# Patient Record
Sex: MALE
[De-identification: names, ages, dates, MRNs, and addresses within clinical notes are randomized per-mention and may not be internally consistent; named-entity substitution may affect disease eponyms.]

---

## 2018-09-09 ENCOUNTER — TRANSCRIPTION ENCOUNTER (OUTPATIENT)
Age: 53
End: 2018-09-09

## 2019-06-24 ENCOUNTER — TRANSCRIPTION ENCOUNTER (OUTPATIENT)
Age: 54
End: 2019-06-24

## 2020-10-04 ENCOUNTER — INPATIENT (INPATIENT)
Facility: HOSPITAL | Age: 55
LOS: 2 days | Discharge: HOME | DRG: 854 | End: 2020-10-07
Attending: STUDENT IN AN ORGANIZED HEALTH CARE EDUCATION/TRAINING PROGRAM | Admitting: HOSPITALIST
Payer: COMMERCIAL

## 2020-10-04 VITALS
SYSTOLIC BLOOD PRESSURE: 185 MMHG | DIASTOLIC BLOOD PRESSURE: 85 MMHG | HEIGHT: 70 IN | TEMPERATURE: 99 F | HEART RATE: 79 BPM | WEIGHT: 175.05 LBS | RESPIRATION RATE: 20 BRPM | OXYGEN SATURATION: 99 %

## 2020-10-04 DIAGNOSIS — D72.829 ELEVATED WHITE BLOOD CELL COUNT, UNSPECIFIED: ICD-10-CM

## 2020-10-04 LAB
ALBUMIN SERPL ELPH-MCNC: 4.8 G/DL — SIGNIFICANT CHANGE UP (ref 3.3–5.2)
ALP SERPL-CCNC: 83 U/L — SIGNIFICANT CHANGE UP (ref 40–120)
ALT FLD-CCNC: 19 U/L — SIGNIFICANT CHANGE UP
ANION GAP SERPL CALC-SCNC: 15 MMOL/L — SIGNIFICANT CHANGE UP (ref 5–17)
APPEARANCE UR: CLEAR — SIGNIFICANT CHANGE UP
AST SERPL-CCNC: 26 U/L — SIGNIFICANT CHANGE UP
BACTERIA # UR AUTO: NEGATIVE — SIGNIFICANT CHANGE UP
BILIRUB SERPL-MCNC: 0.6 MG/DL — SIGNIFICANT CHANGE UP (ref 0.4–2)
BILIRUB UR-MCNC: NEGATIVE — SIGNIFICANT CHANGE UP
BUN SERPL-MCNC: 17 MG/DL — SIGNIFICANT CHANGE UP (ref 8–20)
CALCIUM SERPL-MCNC: 10.3 MG/DL — HIGH (ref 8.6–10.2)
CHLORIDE SERPL-SCNC: 97 MMOL/L — LOW (ref 98–107)
CO2 SERPL-SCNC: 24 MMOL/L — SIGNIFICANT CHANGE UP (ref 22–29)
COLOR SPEC: YELLOW — SIGNIFICANT CHANGE UP
CREAT SERPL-MCNC: 1.09 MG/DL — SIGNIFICANT CHANGE UP (ref 0.5–1.3)
DIFF PNL FLD: ABNORMAL
EPI CELLS # UR: NEGATIVE — SIGNIFICANT CHANGE UP
GLUCOSE SERPL-MCNC: 139 MG/DL — HIGH (ref 70–99)
GLUCOSE UR QL: NEGATIVE MG/DL — SIGNIFICANT CHANGE UP
HCT VFR BLD CALC: 43.5 % — SIGNIFICANT CHANGE UP (ref 39–50)
HGB BLD-MCNC: 14 G/DL — SIGNIFICANT CHANGE UP (ref 13–17)
KETONES UR-MCNC: NEGATIVE — SIGNIFICANT CHANGE UP
LDH SERPL L TO P-CCNC: 290 U/L — HIGH (ref 98–192)
LEUKOCYTE ESTERASE UR-ACNC: NEGATIVE — SIGNIFICANT CHANGE UP
LIDOCAIN IGE QN: 30 U/L — SIGNIFICANT CHANGE UP (ref 22–51)
MCHC RBC-ENTMCNC: 27.7 PG — SIGNIFICANT CHANGE UP (ref 27–34)
MCHC RBC-ENTMCNC: 32.2 GM/DL — SIGNIFICANT CHANGE UP (ref 32–36)
MCV RBC AUTO: 86 FL — SIGNIFICANT CHANGE UP (ref 80–100)
NITRITE UR-MCNC: NEGATIVE — SIGNIFICANT CHANGE UP
PH UR: 5 — SIGNIFICANT CHANGE UP (ref 5–8)
PLATELET # BLD AUTO: 156 K/UL — SIGNIFICANT CHANGE UP (ref 150–400)
POTASSIUM SERPL-MCNC: 5.2 MMOL/L — SIGNIFICANT CHANGE UP (ref 3.5–5.3)
POTASSIUM SERPL-SCNC: 5.2 MMOL/L — SIGNIFICANT CHANGE UP (ref 3.5–5.3)
PROT SERPL-MCNC: 7 G/DL — SIGNIFICANT CHANGE UP (ref 6.6–8.7)
PROT UR-MCNC: 30 MG/DL
RBC # BLD: 5.06 M/UL — SIGNIFICANT CHANGE UP (ref 4.2–5.8)
RBC # FLD: 13.1 % — SIGNIFICANT CHANGE UP (ref 10.3–14.5)
RBC CASTS # UR COMP ASSIST: ABNORMAL /HPF (ref 0–4)
SODIUM SERPL-SCNC: 136 MMOL/L — SIGNIFICANT CHANGE UP (ref 135–145)
SP GR SPEC: 1.02 — SIGNIFICANT CHANGE UP (ref 1.01–1.02)
TROPONIN T SERPL-MCNC: <0.01 NG/ML — SIGNIFICANT CHANGE UP (ref 0–0.06)
UROBILINOGEN FLD QL: NEGATIVE MG/DL — SIGNIFICANT CHANGE UP
WBC # BLD: 90.84 K/UL — CRITICAL HIGH (ref 3.8–10.5)
WBC # FLD AUTO: 90.84 K/UL — CRITICAL HIGH (ref 3.8–10.5)
WBC UR QL: SIGNIFICANT CHANGE UP

## 2020-10-04 PROCEDURE — 76705 ECHO EXAM OF ABDOMEN: CPT | Mod: 26

## 2020-10-04 PROCEDURE — 99285 EMERGENCY DEPT VISIT HI MDM: CPT

## 2020-10-04 PROCEDURE — 74177 CT ABD & PELVIS W/CONTRAST: CPT | Mod: 26

## 2020-10-04 PROCEDURE — 93010 ELECTROCARDIOGRAM REPORT: CPT

## 2020-10-04 PROCEDURE — 99223 1ST HOSP IP/OBS HIGH 75: CPT

## 2020-10-04 RX ORDER — SODIUM CHLORIDE 9 MG/ML
1000 INJECTION INTRAMUSCULAR; INTRAVENOUS; SUBCUTANEOUS
Refills: 0 | Status: DISCONTINUED | OUTPATIENT
Start: 2020-10-04 | End: 2020-10-06

## 2020-10-04 RX ORDER — MORPHINE SULFATE 50 MG/1
1 CAPSULE, EXTENDED RELEASE ORAL EVERY 4 HOURS
Refills: 0 | Status: DISCONTINUED | OUTPATIENT
Start: 2020-10-04 | End: 2020-10-06

## 2020-10-04 RX ORDER — ONDANSETRON 8 MG/1
4 TABLET, FILM COATED ORAL EVERY 6 HOURS
Refills: 0 | Status: DISCONTINUED | OUTPATIENT
Start: 2020-10-04 | End: 2020-10-06

## 2020-10-04 RX ORDER — ONDANSETRON 8 MG/1
4 TABLET, FILM COATED ORAL ONCE
Refills: 0 | Status: COMPLETED | OUTPATIENT
Start: 2020-10-04 | End: 2020-10-04

## 2020-10-04 RX ORDER — METOPROLOL TARTRATE 50 MG
5 TABLET ORAL ONCE
Refills: 0 | Status: COMPLETED | OUTPATIENT
Start: 2020-10-04 | End: 2020-10-04

## 2020-10-04 RX ORDER — MORPHINE SULFATE 50 MG/1
2 CAPSULE, EXTENDED RELEASE ORAL ONCE
Refills: 0 | Status: DISCONTINUED | OUTPATIENT
Start: 2020-10-04 | End: 2020-10-04

## 2020-10-04 RX ORDER — ACETAMINOPHEN 500 MG
650 TABLET ORAL EVERY 6 HOURS
Refills: 0 | Status: DISCONTINUED | OUTPATIENT
Start: 2020-10-04 | End: 2020-10-06

## 2020-10-04 RX ORDER — ENOXAPARIN SODIUM 100 MG/ML
40 INJECTION SUBCUTANEOUS DAILY
Refills: 0 | Status: DISCONTINUED | OUTPATIENT
Start: 2020-10-04 | End: 2020-10-06

## 2020-10-04 RX ADMIN — Medication 650 MILLIGRAM(S): at 19:55

## 2020-10-04 RX ADMIN — ONDANSETRON 4 MILLIGRAM(S): 8 TABLET, FILM COATED ORAL at 14:56

## 2020-10-04 RX ADMIN — SODIUM CHLORIDE 75 MILLILITER(S): 9 INJECTION INTRAMUSCULAR; INTRAVENOUS; SUBCUTANEOUS at 17:09

## 2020-10-04 RX ADMIN — Medication 5 MILLIGRAM(S): at 20:21

## 2020-10-04 RX ADMIN — MORPHINE SULFATE 2 MILLIGRAM(S): 50 CAPSULE, EXTENDED RELEASE ORAL at 14:56

## 2020-10-04 RX ADMIN — MORPHINE SULFATE 1 MILLIGRAM(S): 50 CAPSULE, EXTENDED RELEASE ORAL at 18:47

## 2020-10-04 NOTE — H&P ADULT - GASTROINTESTINAL DETAILS
no masses palpable/no guarding/soft/nontender/no distention/bowel sounds normal/no rebound tenderness

## 2020-10-04 NOTE — H&P ADULT - RS GEN PE MLT RESP DETAILS PC
respirations non-labored/no chest wall tenderness/breath sounds equal/good air movement/airway patent/clear to auscultation bilaterally

## 2020-10-04 NOTE — ED ADULT NURSE REASSESSMENT NOTE - NS ED NURSE REASSESS COMMENT FT1
pt to be admitted wife at bedside, emotional support given, patient anxious  Covid swab sent to lab  report given to Viviane Chew RN

## 2020-10-04 NOTE — ED STATDOCS - CLINICAL SUMMARY MEDICAL DECISION MAKING FREE TEXT BOX
RUQ abdominal pain after eating fatty meal. Concern for biliary colic. Pt with severe leukocytosis concerning for myeloproliferative disorder

## 2020-10-04 NOTE — H&P ADULT - HISTORY OF PRESENT ILLNESS
Mr. Parks is a 54 yo M with no PMHx who presented to the ED for abdominal pain. He states Friday night her drank about 4 old beers and ate a bunch of Pizza, he developed an upset stomach but it resolved after he had a BM. The following day he ate Chipotle for lunch and felt fine but yesterday at about 6pm he developed abdominal discomfort. It is associated with nausea and a small amount of vomiting. Pain is located in the right upper and middle abdomen and does not radiate. He reports increased belching as well. He took some Pepto without relief. He denies fever, chills, weight loss, night sweats or loss of appetite. He does state he has not eaten since the pain started. His mothers has a Hx fo NHL. He denies diarrhea and constipation.     In the ED he was given IV morphine with relief of his abdominal pain, he was found to have a leukocytosis of 90,000. Hematology was consulted. He is pending blood work and an abdominal CT scan. Mr. Parks is a 54 yo M with no PMHx who presented to the ED for abdominal pain. He states Friday night her drank about 4 old beers and ate a bunch of Pizza, he developed an upset stomach but it resolved after he had a BM. The following day he ate Chipotle for lunch and felt fine but yesterday at about 6pm he developed abdominal discomfort. It is associated with nausea and a small amount of vomiting. Pain is located in the right upper and middle abdomen and does not radiate. He reports increased belching as well. He took some Pepto-Bismol  without relief. He denies fever, chills, weight loss, night sweats or loss of appetite. He does state he has not eaten since the pain started. His mothers has a Hx fo NHL. He denies diarrhea and constipation.     In the ED he was given IV morphine with relief of his abdominal pain, he was found to have a leukocytosis of 90,000. Hematology was consulted. He is pending blood work and an abdominal CT scan.

## 2020-10-04 NOTE — H&P ADULT - NSICDXFAMILYHX_GEN_ALL_CORE_FT
FAMILY HISTORY:  Family history of Hodgkin's lymphoma, dementia     FAMILY HISTORY:  Family history of non-Hodgkin's lymphoma

## 2020-10-04 NOTE — ED STATDOCS - OBJECTIVE STATEMENT
54 y/o M with no pertinent PMHx of pancreatitis 20 years ago presents to the ED c/o RUQ abdominal pain x2days. Pt states he went to urgent care and was told he has a possible kidney stone. Pt states after eating chipotle yesterday he began to have abdominal discomfort. Pt states pain and discomfort worsening today. Pt reports feeling bloated. Denies nausea.  Denies hx of abdominal surgeries. Took Pepto-Bismol for pain. Pt states 1 emesis episode last night and dry heaving today. 56 y/o M with PMHx of pancreatitis 20 years ago presents to the ED c/o RUQ abdominal pain x2days. Pt states he went to urgent care and was told he has a possible kidney stone. Pt states after eating Chipotle yesterday he began to have abdominal discomfort. Pt states pain and discomfort are worsening today. He reports feeling bloated. Denies nausea but notes 1 episode of vomiting yesterday and dry heaving today. Denies hx of abdominal surgeries. Took Pepto-Bismol for pain.

## 2020-10-04 NOTE — ED ADULT TRIAGE NOTE - CHIEF COMPLAINT QUOTE
right upper quadrant abdominal pain nausea, went to urgent care had blood in urine. urinated this am.

## 2020-10-04 NOTE — H&P ADULT - ASSESSMENT
56 yo M with no PMHx who presented to the ED for abdominal pain. In the ED he was given IV morphine with relief of his abdominal pain, he was found to have a leukocytosis of 90,000. Hematology was consulted. He is pending blood work and an abdominal CT scan.     Abdominal pain and Leukocytosis  abdominal exam benign  wbc 90,000  - hematology consulted, will see in AM  - CT a/p ordered  - Labs ordered YADI 2, LDH, 56 yo M with no PMHx who presented to the ED for abdominal pain. In the ED he was given IV morphine with relief of his abdominal pain, he was found to have a leukocytosis of 90,000. Hematology was consulted. He is pending blood work and an abdominal CT scan.     Abdominal pain and Leukocytosis  abdominal exam benign  wbc 90,000  - hematology consulted, will see in AM  - CT a/p ordered  - Labs ordered YADI 2, LDH, BCR/ABL, FLow Cytometry, Beta 2 microglobulins  - Repeat CBC in am  - prn pain control  - prn ant-emetic    Hypercalcemia possibly from dehydration vs malignancy  - IVFS and recheck in am     dvt ppx: Lovenox  Dispo: pending CT and Hem consulted 56 yo M with no PMHx who presented to the ED for abdominal pain. In the ED he was given IV morphine with relief of his abdominal pain, he was found to have a leukocytosis of 90,000. Hematology was consulted. He is pending blood work and an abdominal CT scan.     Abdominal pain and Leukocytosis possible Leukemia vs Lymphoma  abdominal exam benign  wbc 90,000  Mother has hx of NHL  - manual differential, requested  - hematology consulted, will see in AM  - f/u CT a/p ordered  - Labs ordered YADI 2, LDH, BCR/ABL, FLow Cytometry, Beta 2 microglobulins  - Repeat CBC in am  - prn pain control for abdominal pain  - prn ant-emetic for nausea    Hypercalcemia possibly from dehydration vs malignancy  - IVFS and recheck in am     dvt ppx: Lovenox  Fullcode  Dispo: pending CT and Hem consulted

## 2020-10-05 LAB
ALBUMIN SERPL ELPH-MCNC: 4.3 G/DL — SIGNIFICANT CHANGE UP (ref 3.3–5.2)
ALP SERPL-CCNC: 77 U/L — SIGNIFICANT CHANGE UP (ref 40–120)
ALT FLD-CCNC: 14 U/L — SIGNIFICANT CHANGE UP
ANION GAP SERPL CALC-SCNC: 14 MMOL/L — SIGNIFICANT CHANGE UP (ref 5–17)
AST SERPL-CCNC: 14 U/L — SIGNIFICANT CHANGE UP
B2 MICROGLOB SERPL-MCNC: 2.8 MG/L — HIGH (ref 0.8–2.2)
BILIRUB SERPL-MCNC: 1.3 MG/DL — SIGNIFICANT CHANGE UP (ref 0.4–2)
BUN SERPL-MCNC: 16 MG/DL — SIGNIFICANT CHANGE UP (ref 8–20)
CALCIUM SERPL-MCNC: 9.3 MG/DL — SIGNIFICANT CHANGE UP (ref 8.6–10.2)
CHLORIDE SERPL-SCNC: 101 MMOL/L — SIGNIFICANT CHANGE UP (ref 98–107)
CO2 SERPL-SCNC: 25 MMOL/L — SIGNIFICANT CHANGE UP (ref 22–29)
CREAT SERPL-MCNC: 1.34 MG/DL — HIGH (ref 0.5–1.3)
GLUCOSE SERPL-MCNC: 109 MG/DL — HIGH (ref 70–99)
HCT VFR BLD CALC: 43 % — SIGNIFICANT CHANGE UP (ref 39–50)
HCV AB S/CO SERPL IA: 0.06 S/CO — SIGNIFICANT CHANGE UP (ref 0–0.99)
HCV AB SERPL-IMP: SIGNIFICANT CHANGE UP
HGB BLD-MCNC: 13.7 G/DL — SIGNIFICANT CHANGE UP (ref 13–17)
LYMPHOCYTES # BLD AUTO: 85 % — HIGH (ref 13–44)
LYMPHOCYTES # BLD AUTO: 87 % — HIGH (ref 13–44)
LYMPHOCYTES # SPEC AUTO: 4 % — HIGH (ref 0–0)
LYMPHOCYTES # SPEC AUTO: 4 % — HIGH (ref 0–0)
MAGNESIUM SERPL-MCNC: 1.8 MG/DL — SIGNIFICANT CHANGE UP (ref 1.6–2.6)
MCHC RBC-ENTMCNC: 27.6 PG — SIGNIFICANT CHANGE UP (ref 27–34)
MCHC RBC-ENTMCNC: 31.9 GM/DL — LOW (ref 32–36)
MCV RBC AUTO: 86.5 FL — SIGNIFICANT CHANGE UP (ref 80–100)
MONOCYTES NFR BLD AUTO: 2 % — SIGNIFICANT CHANGE UP (ref 2–14)
MONOCYTES NFR BLD AUTO: 2 % — SIGNIFICANT CHANGE UP (ref 2–14)
NEUTROPHILS NFR BLD AUTO: 7 % — LOW (ref 43–77)
NEUTROPHILS NFR BLD AUTO: 9 % — LOW (ref 43–77)
PLAT MORPH BLD: NORMAL — SIGNIFICANT CHANGE UP
PLAT MORPH BLD: NORMAL — SIGNIFICANT CHANGE UP
PLATELET # BLD AUTO: 128 K/UL — LOW (ref 150–400)
POTASSIUM SERPL-MCNC: 4.2 MMOL/L — SIGNIFICANT CHANGE UP (ref 3.5–5.3)
POTASSIUM SERPL-SCNC: 4.2 MMOL/L — SIGNIFICANT CHANGE UP (ref 3.5–5.3)
PROT SERPL-MCNC: 6.3 G/DL — LOW (ref 6.6–8.7)
RBC # BLD: 4.97 M/UL — SIGNIFICANT CHANGE UP (ref 4.2–5.8)
RBC # FLD: 13.2 % — SIGNIFICANT CHANGE UP (ref 10.3–14.5)
RBC BLD AUTO: NORMAL — SIGNIFICANT CHANGE UP
RBC BLD AUTO: NORMAL — SIGNIFICANT CHANGE UP
SARS-COV-2 IGG SERPL QL IA: NEGATIVE — SIGNIFICANT CHANGE UP
SARS-COV-2 IGM SERPL IA-ACNC: <0.1 INDEX — SIGNIFICANT CHANGE UP
SARS-COV-2 RNA SPEC QL NAA+PROBE: SIGNIFICANT CHANGE UP
SODIUM SERPL-SCNC: 140 MMOL/L — SIGNIFICANT CHANGE UP (ref 135–145)
WBC # BLD: 69.01 K/UL — CRITICAL HIGH (ref 3.8–10.5)
WBC # FLD AUTO: 69.01 K/UL — CRITICAL HIGH (ref 3.8–10.5)

## 2020-10-05 PROCEDURE — 78226 HEPATOBILIARY SYSTEM IMAGING: CPT | Mod: 26

## 2020-10-05 PROCEDURE — 99223 1ST HOSP IP/OBS HIGH 75: CPT

## 2020-10-05 PROCEDURE — 99233 SBSQ HOSP IP/OBS HIGH 50: CPT

## 2020-10-05 PROCEDURE — 71045 X-RAY EXAM CHEST 1 VIEW: CPT | Mod: 26

## 2020-10-05 RX ORDER — PIPERACILLIN AND TAZOBACTAM 4; .5 G/20ML; G/20ML
3.38 INJECTION, POWDER, LYOPHILIZED, FOR SOLUTION INTRAVENOUS EVERY 6 HOURS
Refills: 0 | Status: DISCONTINUED | OUTPATIENT
Start: 2020-10-05 | End: 2020-10-05

## 2020-10-05 RX ORDER — MORPHINE SULFATE 50 MG/1
3.2 CAPSULE, EXTENDED RELEASE ORAL ONCE
Refills: 0 | Status: DISCONTINUED | OUTPATIENT
Start: 2020-10-05 | End: 2020-10-05

## 2020-10-05 RX ORDER — PIPERACILLIN AND TAZOBACTAM 4; .5 G/20ML; G/20ML
3.38 INJECTION, POWDER, LYOPHILIZED, FOR SOLUTION INTRAVENOUS EVERY 8 HOURS
Refills: 0 | Status: DISCONTINUED | OUTPATIENT
Start: 2020-10-05 | End: 2020-10-06

## 2020-10-05 RX ADMIN — MORPHINE SULFATE 1 MILLIGRAM(S): 50 CAPSULE, EXTENDED RELEASE ORAL at 19:38

## 2020-10-05 RX ADMIN — Medication 650 MILLIGRAM(S): at 15:38

## 2020-10-05 RX ADMIN — SODIUM CHLORIDE 75 MILLILITER(S): 9 INJECTION INTRAMUSCULAR; INTRAVENOUS; SUBCUTANEOUS at 19:38

## 2020-10-05 RX ADMIN — ENOXAPARIN SODIUM 40 MILLIGRAM(S): 100 INJECTION SUBCUTANEOUS at 13:33

## 2020-10-05 RX ADMIN — MORPHINE SULFATE 3.2 MILLIGRAM(S): 50 CAPSULE, EXTENDED RELEASE ORAL at 01:10

## 2020-10-05 RX ADMIN — MORPHINE SULFATE 3.2 MILLIGRAM(S): 50 CAPSULE, EXTENDED RELEASE ORAL at 09:51

## 2020-10-05 RX ADMIN — PIPERACILLIN AND TAZOBACTAM 25 GRAM(S): 4; .5 INJECTION, POWDER, LYOPHILIZED, FOR SOLUTION INTRAVENOUS at 15:39

## 2020-10-05 RX ADMIN — MORPHINE SULFATE 1 MILLIGRAM(S): 50 CAPSULE, EXTENDED RELEASE ORAL at 19:53

## 2020-10-05 RX ADMIN — PIPERACILLIN AND TAZOBACTAM 25 GRAM(S): 4; .5 INJECTION, POWDER, LYOPHILIZED, FOR SOLUTION INTRAVENOUS at 21:23

## 2020-10-05 RX ADMIN — Medication 650 MILLIGRAM(S): at 16:38

## 2020-10-05 NOTE — PROGRESS NOTE ADULT - ASSESSMENT
56 y/o M with no PMHx presented to the ED with complaints of abdominal pain. In the ED he was given IV morphine with relief of his abdominal pain, he was found to have a leukocytosis of 90,000.     Abdominal pain and Leukocytosis possible Leukemia vs Lymphoma  - WBC trending down from 90 to 69  - CT abdomen/pelvis: diffuse nonspecific gallbladder wall edema may represent primary gallbladder pathology, secondary to chronic liver disease or systemic disease. 16cm splenomegaly  - manual differential requested  - Hematology consulted  - Beta 2 microglobumin 2.8  -     RUQ pain  - resolved  - CT abdomen/pelvis noted  - US Gallbladder: cholelithiasis and gallbladder wall thickening. CBD caliber normal  - HIDA scan ordered    Hypercalcemia possibly from dehydration vs malignancy  - resolved  - 9.3    ROBERT  - Cr 1.34  - monitor BMP  - avoid nephrotoxic medications    DVT ppx  - Lovenox SQ

## 2020-10-05 NOTE — CONSULT NOTE ADULT - ASSESSMENT
54 yo M w/ PMH of CLL with HIDA confirmed acute cholecystitis    - booked for OR 10/6 w/ Dr. Arevalo  - NPO/IVF at midnight  - continue zosyn  - pre op labs (cbc/bmp/type and screen)  - EKG/CXR

## 2020-10-05 NOTE — CONSULT NOTE ADULT - SUBJECTIVE AND OBJECTIVE BOX
HPI:  Mr. Parks is a 56 yo M with no PMHx who presented to the ED for abdominal pain. He states Friday night her drank about 4 old beers and ate a bunch of Pizza, he developed an upset stomach but it resolved after he had a BM. The following day he ate Chipotle for lunch and felt fine but yesterday at about 6pm he developed abdominal discomfort. It is associated with nausea and a small amount of vomiting. Pain is located in the right upper and middle abdomen and does not radiate. He reports increased belching as well. He took some Pepto-Bismol  without relief. He denies fever, chills, weight loss, night sweats or loss of appetite. He does state he has not eaten since the pain started. His mothers has a Hx fo NHL. He denies diarrhea and constipation.     In the ED he was given IV morphine with relief of his abdominal pain, he was found to have a leukocytosis of 90,000. Hematology was consulted. He is pending blood work and an abdominal CT scan.  (04 Oct 2020 16:35)      PAST MEDICAL & SURGICAL HISTORY:  No pertinent past medical history    No significant past surgical history        MEDICATIONS  (STANDING):  enoxaparin Injectable 40 milliGRAM(s) SubCutaneous daily  piperacillin/tazobactam IVPB.. 3.375 Gram(s) IV Intermittent every 8 hours  sodium chloride 0.9%. 1000 milliLiter(s) (75 mL/Hr) IV Continuous <Continuous>    MEDICATIONS  (PRN):  acetaminophen   Tablet .. 650 milliGRAM(s) Oral every 6 hours PRN Temp greater or equal to 38C (100.4F), Mild Pain (1 - 3)  aluminum hydroxide/magnesium hydroxide/simethicone Suspension 30 milliLiter(s) Oral every 4 hours PRN Dyspepsia  morphine  - Injectable 1 milliGRAM(s) IV Push every 4 hours PRN Severe Pain (7 - 10)  ondansetron Injectable 4 milliGRAM(s) IV Push every 6 hours PRN Nausea and/or Vomiting      Allergies    No Known Allergies    Intolerances        FAMILY HISTORY:  Family history of non-Hodgkin&#x27;s lymphoma        Review of Systems    Constitutional, Eyes, ENT, Cardiovascular, Respiratory, Gastrointestinal, Genitourinary, Musculoskeletal, Integumentary, Neurological, Psychiatric, Endocrine, Heme/Lymph and Allergic/Immunologic review of systems are otherwise negative except as noted in HPI.     Vital Signs Last 24 Hrs  T(C): 36.7 (05 Oct 2020 15:38), Max: 37 (05 Oct 2020 07:25)  T(F): 98.1 (05 Oct 2020 15:38), Max: 98.6 (05 Oct 2020 07:25)  HR: 73 (05 Oct 2020 15:38) (71 - 84)  BP: 158/93 (05 Oct 2020 15:38) (122/74 - 158/93)  BP(mean): 115 (05 Oct 2020 15:38) (115 - 115)  RR: 18 (05 Oct 2020 15:38) (18 - 19)  SpO2: 100% (05 Oct 2020 15:38) (99% - 100%)    Physical Exam  Constitutional: well developed, well nourished, in no acute distress and thin.   Eyes: PERRL, EOMI, no conjunctival infection, anicteric.   ENT: pharynx is unremarkable, moist mucus membrane, no oral lesions.   Neck: supple without JVD, no thyromegaly or masses appreciated.   Pulmonary: clear to auscultation bilaterally, no dullness, no wheezing.   Cardiac: RRR, normal S1S2, no murmurs, rubs, gallops.   Vascular: no JVD, no calf tenderness, venous stasis changes, varices.   Abdomen: normoactive bowel sounds, soft and nontender, no hepatosplenomegaly or masses appreciated.   Lymphatic: no peripheral adenopathy appreciated.   Musculoskeletal: full range of motion and no deformities appreciated.   Skin: normal appearance, no rash, nodules, vesicles, ulcers, erythema.   Neurology: grossly intact.   Psychiatric: affect appropriate.       LABS:  CBC Full  -  ( 05 Oct 2020 07:23 )  WBC Count : 69.01 K/uL  RBC Count : 4.97 M/uL  Hemoglobin : 13.7 g/dL  Hematocrit : 43.0 %  Platelet Count - Automated : 128 K/uL  Mean Cell Volume : 86.5 fl  Mean Cell Hemoglobin : 27.6 pg  Mean Cell Hemoglobin Concentration : 31.9 gm/dL  Auto Neutrophil # : x  Auto Lymphocyte # : x  Auto Monocyte # : x  Auto Eosinophil # : x  Auto Basophil # : x  Auto Neutrophil % : 7.0 %  Auto Lymphocyte % : 87.0 %  Auto Monocyte % : 2.0 %  Auto Eosinophil % : x  Auto Basophil % : x    10-05    140  |  101  |  16.0  ----------------------------<  109<H>  4.2   |  25.0  |  1.34<H>    Ca    9.3      05 Oct 2020 07:23  Mg     1.8     10-05    TPro  6.3<L>  /  Alb  4.3  /  TBili  1.3  /  DBili  x   /  AST  14  /  ALT  14  /  AlkPhos  77  10-05      Urinalysis Basic - ( 04 Oct 2020 15:18 )    Color: Yellow / Appearance: Clear / S.025 / pH: x  Gluc: x / Ketone: Negative  / Bili: Negative / Urobili: Negative mg/dL   Blood: x / Protein: 30 mg/dL / Nitrite: Negative   Leuk Esterase: Negative / RBC: 3-5 /HPF / WBC 0-2   Sq Epi: x / Non Sq Epi: Negative / Bacteria: Negative        RADIOLOG< from: NM Hepatobiliary Imaging (10.05.20 @ 11:17) >   EXAM:  NM HEPATOBILIARY IMG                          PROCEDURE DATE:  10/05/2020          INTERPRETATION:  CLINICAL INFORMATION: 55-year-old male, right upper quadrant abdominal pain cholelithiasis with gallbladder wall thickening on ultrasound, evaluate for acute cholecystitis.    RADIOPHARMACEUTICAL: 3.0 mCi and 2.9 mCi Tc-99m-Mebrofenin, I.V.    TECHNIQUE:  Dynamic imaging of the anterior abdomen was performed for 2 hours after the injection of radiotracer followed by static images of the abdomen in the anterior, right lateral and right anterior oblique projections.  Morphine 3.2 mg I.V. and a second dose of radiotracer were administered at 1 hour.    COMPARISON: No prior hepatobiliary scan.    FINDINGS: There is prompt, homogeneous uptake of radiotracer by the hepatocytes. Activity is first seen in the bowel at 15 minutes. The gallbladder is not visualized at any time during the study, despite administration of morphine. There is good clearance of activity from the liver at the endof the study.    IMPRESSION: Abnormal morphine-augmented hepatobiliary scan.    Findings consistent with acute cholecystitis.    Dr. Ann Lee was notified of these results by telephone, on 10/5/2020, at about 12:10 PM, with read back confirmation.                RUTH GLEASON M.D., NUCLEAR MEDICINE ATTENDING  This document has been electronically signed. Oct  5 2020 12:10PM    < end of copied text >  Y & ADDITIONAL STUDIES:

## 2020-10-05 NOTE — PROGRESS NOTE ADULT - SUBJECTIVE AND OBJECTIVE BOX
Chief complaint: Abdominal pain    Patient seen and examined at bedside. No acute overnight events reported. Patient reports he is feeling well, abdominal pain is resolved. Denies headache, fever, chills, cough, chest pain, shortness of breath, nausea, vomiting, abdominal pain, diarrhea or constipation.     Vital Signs Last 24 Hrs  T(F): 98.6 (05 Oct 2020 07:25), Max: 99.4 (04 Oct 2020 13:31)  HR: 84 (05 Oct 2020 07:25) (70 - 86)  BP: 122/74 (05 Oct 2020 07:25) (122/74 - 194/87)  RR: 18 (05 Oct 2020 07:25) (18 - 20)  SpO2: 100% (05 Oct 2020 07:25) (98% - 100%)    Physical Exam:  Constitutional: alert and oriented, in no acute distress   Neck: Soft and supple, No LAD, No JVD  Respiratory: Clear to auscultation bilaterally, no wheezes or crackles  Cardiovascular: Regular rate and rhythm no murmurs, gallops, rubs  Gastrointestinal: Soft, non-tender to palpation, +bs  Vascular: 2+ peripheral pulses  Neurological: A/O x 3, no focal neurological deficits  Musculoskeletal: 5/5 strength b/l upper and lower extremities, no lower extremity edema bilaterally    Labs:                        13.7   69.01 )-----------( 128      ( 05 Oct 2020 07:23 )             43.0   10-05    140  |  101  |  16.0  ----------------------------<  109<H>  4.2   |  25.0  |  1.34<H>    Ca    9.3      05 Oct 2020 07:23  Mg     1.8     10-05    TPro  6.3<L>  /  Alb  4.3  /  TBili  1.3  /  DBili  x   /  AST  14  /  ALT  14  /  AlkPhos  77  10-05

## 2020-10-05 NOTE — CONSULT NOTE ADULT - PROBLEM SELECTOR RECOMMENDATION 9
-WBC down from 90 K on 10/4 to 69 K on 10/5  -reviewed peripheral smear with pathology, Dr. Benjamin, who originally thought he saw 15-20 % blasts.    -dx now most c/w CLL  -await results of flow cytometry for confirmation.    -labs sent for: JAK2 and BCR-ABL, FLT3 and PML   -B2mg, LDH pending  -will follow outpt with Dr. Antony when medically optimized

## 2020-10-05 NOTE — CONSULT NOTE ADULT - SUBJECTIVE AND OBJECTIVE BOX
Tr Acute Care Surgery Consult Note    HPI:   55yMale w/ PMH of recently diagnosed CLL currently admitted to medicine for 2 day history of abdominal pain and leukocytosis (90-->60). Workup with CT/US/ and HIDA revealed acute cholecystitis. Patient reports RUQ abd pain 10/2 after eating 4 slices of pizza. has never had pain before. pain improved and recurred over the course of 10/3-10/4 which caused him to present to ED. ED/Medical workup revealed acute cholecystitis. General surgery was consulted to evaluate     PMH:  CLL        PSH:  No significant past surgical history      Home Medications:    ALL: denies      FMH:  Denies family history of gastrointestinal malignancy     SOC Hx:   Denies etoh, tobacco, or drug use       Physical Exam:   Gen: well appearing male, NAD  Neuro: AOx3, EOMI, PERRLA, no gross deficit on exam  HEENT: No oral/mucosal lesions, no neck masses or lymphadenopathy  RESP: CTABL, nonlabored breathing  CVS: RRR,   GI: abd soft, NT, ND, no rebound/guarding. Negative de la rosa sign.   Extremities: 2+ peripheral pulses        LABS:  cret                        13.7   69.01 )-----------( 128      ( 05 Oct 2020 07:23 )             43.0     10-05    140  |  101  |  16.0  ----------------------------<  109<H>  4.2   |  25.0  |  1.34<H>    Ca    9.3      05 Oct 2020 07:23  Mg     1.8     10-05    TPro  6.3<L>  /  Alb  4.3  /  TBili  1.3  /  DBili  x   /  AST  14  /  ALT  14  /  AlkPhos  77  10-05        t< from: CT Abdomen and Pelvis w/ IV Cont (10.04.20 @ 17:19) >  IMPRESSION:    Diffuse nonspecific gallbladder wall edema may represent primary gallbladder pathology, secondary to chronic liver disease, or systemic disease. Correlate for right upper quadrant pain.    16 cm splenomegaly.    < end of copied text >  < from: CT Abdomen and Pelvis w/ IV Cont (10.04.20 @ 17:19) >  IMPRESSION:    Diffuse nonspecific gallbladder wall edema may represent primary gallbladder pathology, secondary to chronic liver disease, or systemic disease. Correlate for right upper quadrant pain.    16 cm splenomegaly.    < end of copied text >  < from: NM Hepatobiliary Imaging (10.05.20 @ 11:17) >    IMPRESSION: Abnormal morphine-augmented hepatobiliary scan.    Findings consistent with acute cholecystitis.    < end of copied text >

## 2020-10-06 ENCOUNTER — RESULT REVIEW (OUTPATIENT)
Age: 55
End: 2020-10-06

## 2020-10-06 DIAGNOSIS — K81.0 ACUTE CHOLECYSTITIS: ICD-10-CM

## 2020-10-06 DIAGNOSIS — D72.829 ELEVATED WHITE BLOOD CELL COUNT, UNSPECIFIED: ICD-10-CM

## 2020-10-06 LAB
ANION GAP SERPL CALC-SCNC: 13 MMOL/L — SIGNIFICANT CHANGE UP (ref 5–17)
ANISOCYTOSIS BLD QL: SLIGHT — SIGNIFICANT CHANGE UP
APTT BLD: 28.2 SEC — SIGNIFICANT CHANGE UP (ref 27.5–35.5)
BASOPHILS # BLD AUTO: 0 K/UL — SIGNIFICANT CHANGE UP (ref 0–0.2)
BASOPHILS NFR BLD AUTO: 0 % — SIGNIFICANT CHANGE UP (ref 0–2)
BLD GP AB SCN SERPL QL: SIGNIFICANT CHANGE UP
BUN SERPL-MCNC: 23 MG/DL — HIGH (ref 8–20)
CALCIUM SERPL-MCNC: 8.9 MG/DL — SIGNIFICANT CHANGE UP (ref 8.6–10.2)
CHLORIDE SERPL-SCNC: 98 MMOL/L — SIGNIFICANT CHANGE UP (ref 98–107)
CO2 SERPL-SCNC: 26 MMOL/L — SIGNIFICANT CHANGE UP (ref 22–29)
CREAT SERPL-MCNC: 1.63 MG/DL — HIGH (ref 0.5–1.3)
EOSINOPHIL # BLD AUTO: 0.67 K/UL — HIGH (ref 0–0.5)
EOSINOPHIL NFR BLD AUTO: 0.9 % — SIGNIFICANT CHANGE UP (ref 0–6)
GLUCOSE SERPL-MCNC: 118 MG/DL — HIGH (ref 70–99)
HCT VFR BLD CALC: 44.8 % — SIGNIFICANT CHANGE UP (ref 39–50)
HGB BLD-MCNC: 14.3 G/DL — SIGNIFICANT CHANGE UP (ref 13–17)
INR BLD: 1.4 RATIO — HIGH (ref 0.88–1.16)
LACTATE SERPL-SCNC: 2.1 MMOL/L — HIGH (ref 0.5–2)
LYMPHOCYTES # BLD AUTO: 66.98 K/UL — HIGH (ref 1–3.3)
LYMPHOCYTES # BLD AUTO: 90.3 % — HIGH (ref 13–44)
MAGNESIUM SERPL-MCNC: 1.7 MG/DL — SIGNIFICANT CHANGE UP (ref 1.6–2.6)
MANUAL SMEAR VERIFICATION: SIGNIFICANT CHANGE UP
MCHC RBC-ENTMCNC: 27.9 PG — SIGNIFICANT CHANGE UP (ref 27–34)
MCHC RBC-ENTMCNC: 31.9 GM/DL — LOW (ref 32–36)
MCV RBC AUTO: 87.3 FL — SIGNIFICANT CHANGE UP (ref 80–100)
MICROCYTES BLD QL: SLIGHT — SIGNIFICANT CHANGE UP
MONOCYTES # BLD AUTO: 0 K/UL — SIGNIFICANT CHANGE UP (ref 0–0.9)
MONOCYTES NFR BLD AUTO: 0 % — LOW (ref 2–14)
NEUTROPHILS # BLD AUTO: 6.53 K/UL — SIGNIFICANT CHANGE UP (ref 1.8–7.4)
NEUTROPHILS NFR BLD AUTO: 8.8 % — LOW (ref 43–77)
PHOSPHATE SERPL-MCNC: 3 MG/DL — SIGNIFICANT CHANGE UP (ref 2.4–4.7)
PLAT MORPH BLD: NORMAL — SIGNIFICANT CHANGE UP
PLATELET # BLD AUTO: 88 K/UL — LOW (ref 150–400)
PLATELET COUNT - ESTIMATE: ABNORMAL
POTASSIUM SERPL-MCNC: 4.2 MMOL/L — SIGNIFICANT CHANGE UP (ref 3.5–5.3)
POTASSIUM SERPL-SCNC: 4.2 MMOL/L — SIGNIFICANT CHANGE UP (ref 3.5–5.3)
PROTHROM AB SERPL-ACNC: 16 SEC — HIGH (ref 10.6–13.6)
RBC # BLD: 5.13 M/UL — SIGNIFICANT CHANGE UP (ref 4.2–5.8)
RBC # FLD: 13 % — SIGNIFICANT CHANGE UP (ref 10.3–14.5)
RBC BLD AUTO: NORMAL — SIGNIFICANT CHANGE UP
SODIUM SERPL-SCNC: 137 MMOL/L — SIGNIFICANT CHANGE UP (ref 135–145)
WBC # BLD: 74.17 K/UL — CRITICAL HIGH (ref 3.8–10.5)
WBC # FLD AUTO: 74.17 K/UL — CRITICAL HIGH (ref 3.8–10.5)

## 2020-10-06 PROCEDURE — 88304 TISSUE EXAM BY PATHOLOGIST: CPT | Mod: 26

## 2020-10-06 PROCEDURE — 88360 TUMOR IMMUNOHISTOCHEM/MANUAL: CPT | Mod: 26,59

## 2020-10-06 PROCEDURE — 99233 SBSQ HOSP IP/OBS HIGH 50: CPT

## 2020-10-06 PROCEDURE — 88189 FLOWCYTOMETRY/READ 16 & >: CPT

## 2020-10-06 PROCEDURE — 88341 IMHCHEM/IMCYTCHM EA ADD ANTB: CPT | Mod: 26,59

## 2020-10-06 PROCEDURE — 88342 IMHCHEM/IMCYTCHM 1ST ANTB: CPT | Mod: 26,59

## 2020-10-06 RX ORDER — ACETAMINOPHEN 500 MG
1000 TABLET ORAL ONCE
Refills: 0 | Status: COMPLETED | OUTPATIENT
Start: 2020-10-06 | End: 2020-10-06

## 2020-10-06 RX ORDER — SODIUM CHLORIDE 9 MG/ML
1000 INJECTION INTRAMUSCULAR; INTRAVENOUS; SUBCUTANEOUS ONCE
Refills: 0 | Status: COMPLETED | OUTPATIENT
Start: 2020-10-06 | End: 2020-10-06

## 2020-10-06 RX ORDER — SODIUM CHLORIDE 9 MG/ML
1000 INJECTION INTRAMUSCULAR; INTRAVENOUS; SUBCUTANEOUS
Refills: 0 | Status: DISCONTINUED | OUTPATIENT
Start: 2020-10-06 | End: 2020-10-07

## 2020-10-06 RX ORDER — HYDROMORPHONE HYDROCHLORIDE 2 MG/ML
0.5 INJECTION INTRAMUSCULAR; INTRAVENOUS; SUBCUTANEOUS
Refills: 0 | Status: DISCONTINUED | OUTPATIENT
Start: 2020-10-06 | End: 2020-10-06

## 2020-10-06 RX ORDER — TRAMADOL HYDROCHLORIDE 50 MG/1
50 TABLET ORAL EVERY 6 HOURS
Refills: 0 | Status: DISCONTINUED | OUTPATIENT
Start: 2020-10-06 | End: 2020-10-07

## 2020-10-06 RX ORDER — ACETAMINOPHEN 500 MG
650 TABLET ORAL EVERY 6 HOURS
Refills: 0 | Status: DISCONTINUED | OUTPATIENT
Start: 2020-10-06 | End: 2020-10-07

## 2020-10-06 RX ORDER — SODIUM CHLORIDE 9 MG/ML
1000 INJECTION, SOLUTION INTRAVENOUS
Refills: 0 | Status: DISCONTINUED | OUTPATIENT
Start: 2020-10-06 | End: 2020-10-06

## 2020-10-06 RX ORDER — OXYCODONE HYDROCHLORIDE 5 MG/1
5 TABLET ORAL EVERY 4 HOURS
Refills: 0 | Status: DISCONTINUED | OUTPATIENT
Start: 2020-10-06 | End: 2020-10-07

## 2020-10-06 RX ADMIN — OXYCODONE HYDROCHLORIDE 5 MILLIGRAM(S): 5 TABLET ORAL at 19:28

## 2020-10-06 RX ADMIN — SODIUM CHLORIDE 1000 MILLILITER(S): 9 INJECTION INTRAMUSCULAR; INTRAVENOUS; SUBCUTANEOUS at 01:06

## 2020-10-06 RX ADMIN — MORPHINE SULFATE 1 MILLIGRAM(S): 50 CAPSULE, EXTENDED RELEASE ORAL at 01:06

## 2020-10-06 RX ADMIN — SODIUM CHLORIDE 75 MILLILITER(S): 9 INJECTION INTRAMUSCULAR; INTRAVENOUS; SUBCUTANEOUS at 20:35

## 2020-10-06 RX ADMIN — Medication 400 MILLIGRAM(S): at 01:06

## 2020-10-06 RX ADMIN — SODIUM CHLORIDE 75 MILLILITER(S): 9 INJECTION INTRAMUSCULAR; INTRAVENOUS; SUBCUTANEOUS at 11:55

## 2020-10-06 RX ADMIN — HYDROMORPHONE HYDROCHLORIDE 0.5 MILLIGRAM(S): 2 INJECTION INTRAMUSCULAR; INTRAVENOUS; SUBCUTANEOUS at 19:05

## 2020-10-06 RX ADMIN — SODIUM CHLORIDE 75 MILLILITER(S): 9 INJECTION, SOLUTION INTRAVENOUS at 18:38

## 2020-10-06 RX ADMIN — HYDROMORPHONE HYDROCHLORIDE 0.5 MILLIGRAM(S): 2 INJECTION INTRAMUSCULAR; INTRAVENOUS; SUBCUTANEOUS at 19:28

## 2020-10-06 RX ADMIN — SODIUM CHLORIDE 75 MILLILITER(S): 9 INJECTION INTRAMUSCULAR; INTRAVENOUS; SUBCUTANEOUS at 13:49

## 2020-10-06 RX ADMIN — Medication 1000 MILLIGRAM(S): at 02:37

## 2020-10-06 RX ADMIN — PIPERACILLIN AND TAZOBACTAM 25 GRAM(S): 4; .5 INJECTION, POWDER, LYOPHILIZED, FOR SOLUTION INTRAVENOUS at 13:49

## 2020-10-06 RX ADMIN — HYDROMORPHONE HYDROCHLORIDE 0.5 MILLIGRAM(S): 2 INJECTION INTRAMUSCULAR; INTRAVENOUS; SUBCUTANEOUS at 19:15

## 2020-10-06 RX ADMIN — HYDROMORPHONE HYDROCHLORIDE 0.5 MILLIGRAM(S): 2 INJECTION INTRAMUSCULAR; INTRAVENOUS; SUBCUTANEOUS at 19:06

## 2020-10-06 RX ADMIN — PIPERACILLIN AND TAZOBACTAM 25 GRAM(S): 4; .5 INJECTION, POWDER, LYOPHILIZED, FOR SOLUTION INTRAVENOUS at 05:07

## 2020-10-06 RX ADMIN — HYDROMORPHONE HYDROCHLORIDE 0.5 MILLIGRAM(S): 2 INJECTION INTRAMUSCULAR; INTRAVENOUS; SUBCUTANEOUS at 18:55

## 2020-10-06 RX ADMIN — MORPHINE SULFATE 1 MILLIGRAM(S): 50 CAPSULE, EXTENDED RELEASE ORAL at 01:27

## 2020-10-06 NOTE — PROGRESS NOTE ADULT - SUBJECTIVE AND OBJECTIVE BOX
For lap giorgio   HIDA pos  gallstones   Pt explained the procedure benefits risks and alternative sexplained as well

## 2020-10-06 NOTE — PROGRESS NOTE ADULT - ASSESSMENT
54 y/o M with no PMHx presented to the ED with complaints of abdominal pain. In the ED he was given IV morphine with relief of his abdominal pain, he was found to have a leukocytosis of 90,000.     Chronic Lymphocytic Leukemia  - stable  - WBC 74.17  - CT abdomen/pelvis: diffuse nonspecific gallbladder wall edema may represent primary gallbladder pathology, secondary to chronic liver disease or systemic disease. 16cm splenomegaly  - Hematology consulted, recommendations appreciated  - Patient will follow as an outpatient  - Beta 2 microglobulin 2.8  -     Sepsis secondary to Acute cholecystitis  - CT abdomen/pelvis noted  - US Gallbladder: cholelithiasis and gallbladder wall thickening. CBD caliber normal  - HIDA scan noted acute cholecystitis  - c/w Zosyn   - Surgery consulted, patient for OR today    Hypercalcemia possibly from dehydration vs malignancy  - resolved  - 8.9    ROBERT  - Cr 1.63  - monitor BMP  - avoid nephrotoxic medications    DVT ppx  - Lovenox SQ

## 2020-10-06 NOTE — PROGRESS NOTE ADULT - SUBJECTIVE AND OBJECTIVE BOX
Chief complaint: Abdominal pain     Patient seen and examined at bedside. Overnight, the patient spiked a temperature of 101F. Patient states he is currently feeling well. Denies fever, chills, cough, chest pain, shortness of breath, abdominal pain, nausea, vomiting, diarrhea or constipation.     Vital Signs Last 24 Hrs  T(F): 98.8 (06 Oct 2020 08:34), Max: 101.8 (06 Oct 2020 00:41)  HR: 87 (06 Oct 2020 08:34) (73 - 132)  BP: 132/72 (06 Oct 2020 08:34) (107/71 - 158/93)  RR: 18 (06 Oct 2020 08:34) (18 - 18)  SpO2: 100% (06 Oct 2020 08:34) (98% - 100%)    Physical Exam:  Constitutional: alert and oriented, in no acute distress   Neck: Soft and supple, No LAD, No JVD  Respiratory: Clear to auscultation bilaterally, no wheezes or crackles  Cardiovascular: Regular rate and rhythm no murmurs, gallops, rubs  Gastrointestinal: Soft, non-tender to palpation, +bs  Vascular: 2+ peripheral pulses  Neurological: A/O x 3, no focal neurological deficits  Musculoskeletal: 5/5 strength b/l upper and lower extremities, no lower extremity edema bilaterally    Labs:                        14.3   74.17 )-----------( 88       ( 06 Oct 2020 02:14 )             44.8   10-06    137  |  98  |  23.0<H>  ----------------------------<  118<H>  4.2   |  26.0  |  1.63<H>    Ca    8.9      06 Oct 2020 02:14  Phos  3.0     10-06  Mg     1.7     10-06    TPro  6.3<L>  /  Alb  4.3  /  TBili  1.3  /  DBili  x   /  AST  14  /  ALT  14  /  AlkPhos  77  10-05

## 2020-10-06 NOTE — CHART NOTE - NSCHARTNOTEFT_GEN_A_CORE
Medicine PA-   S- Cd for pt w/ T-101.8 and tachy, pt came in w/ acute giorgio and is scheduled for OR this a.m. Pt incidentally found to have elevated WBC- 90, likely CLL as per hematology consult, pt on zosyn 3.375mg q8hr, MS for pain PRN, CXR 10/5- neg. Pt denies abd pain, "feels better" since ED arrival.  O- On exam pt is comfortable and in NAD,  VSS- 101.8- 107/71- 18- 110-98% RA    S1S2 ausc  Lungs- clear    Abd- +BS, soft, no palp pain or rigidity   A- Acute Giorgio, ?CLL  P- Ofirmev 1gm x1   - Stat: CBC, BMP, blood cx x2, lactate   - NS bolus 1litre x1   Addendum-   VSS- T- 97.9- 115/71- 96-18-98%  Lactate- 2.1                   14.3   74.17 )-----------( 88       ( 06 Oct 2020 02:14 )             44.8   10-06    137  |  98  |  23.0<H>  ----------------------------<  118<H>  4.2   |  26.0  |  1.63<H>    Ca    8.9      06 Oct 2020 02:14  Phos  3.0     10-06  Mg     1.7     10-06    - will increase IVF to 125cc/hr  - repeat lactate a.m.  - continue to monitor, call PA if status changes

## 2020-10-07 ENCOUNTER — TRANSCRIPTION ENCOUNTER (OUTPATIENT)
Age: 55
End: 2020-10-07

## 2020-10-07 VITALS
SYSTOLIC BLOOD PRESSURE: 149 MMHG | OXYGEN SATURATION: 98 % | TEMPERATURE: 98 F | HEART RATE: 71 BPM | RESPIRATION RATE: 18 BRPM | DIASTOLIC BLOOD PRESSURE: 80 MMHG

## 2020-10-07 LAB
ABO RH CONFIRMATION: SIGNIFICANT CHANGE UP
TM INTERPRETATION: SIGNIFICANT CHANGE UP

## 2020-10-07 PROCEDURE — 87040 BLOOD CULTURE FOR BACTERIA: CPT

## 2020-10-07 PROCEDURE — 78226 HEPATOBILIARY SYSTEM IMAGING: CPT

## 2020-10-07 PROCEDURE — 83690 ASSAY OF LIPASE: CPT

## 2020-10-07 PROCEDURE — C1889: CPT

## 2020-10-07 PROCEDURE — 84100 ASSAY OF PHOSPHORUS: CPT

## 2020-10-07 PROCEDURE — 36415 COLL VENOUS BLD VENIPUNCTURE: CPT

## 2020-10-07 PROCEDURE — 88184 FLOWCYTOMETRY/ TC 1 MARKER: CPT

## 2020-10-07 PROCEDURE — 81270 JAK2 GENE: CPT

## 2020-10-07 PROCEDURE — 88304 TISSUE EXAM BY PATHOLOGIST: CPT

## 2020-10-07 PROCEDURE — 88185 FLOWCYTOMETRY/TC ADD-ON: CPT

## 2020-10-07 PROCEDURE — 83735 ASSAY OF MAGNESIUM: CPT

## 2020-10-07 PROCEDURE — 88342 IMHCHEM/IMCYTCHM 1ST ANTB: CPT

## 2020-10-07 PROCEDURE — 88275 CYTOGENETICS 100-300: CPT

## 2020-10-07 PROCEDURE — 99285 EMERGENCY DEPT VISIT HI MDM: CPT | Mod: 25

## 2020-10-07 PROCEDURE — 85025 COMPLETE CBC W/AUTO DIFF WBC: CPT

## 2020-10-07 PROCEDURE — 87205 SMEAR GRAM STAIN: CPT

## 2020-10-07 PROCEDURE — 82232 ASSAY OF BETA-2 PROTEIN: CPT

## 2020-10-07 PROCEDURE — 83605 ASSAY OF LACTIC ACID: CPT

## 2020-10-07 PROCEDURE — 81001 URINALYSIS AUTO W/SCOPE: CPT

## 2020-10-07 PROCEDURE — 83615 LACTATE (LD) (LDH) ENZYME: CPT

## 2020-10-07 PROCEDURE — 80053 COMPREHEN METABOLIC PANEL: CPT

## 2020-10-07 PROCEDURE — 86850 RBC ANTIBODY SCREEN: CPT

## 2020-10-07 PROCEDURE — 96374 THER/PROPH/DIAG INJ IV PUSH: CPT | Mod: XU

## 2020-10-07 PROCEDURE — 85027 COMPLETE CBC AUTOMATED: CPT

## 2020-10-07 PROCEDURE — 85730 THROMBOPLASTIN TIME PARTIAL: CPT

## 2020-10-07 PROCEDURE — 81206 BCR/ABL1 GENE MAJOR BP: CPT

## 2020-10-07 PROCEDURE — 74177 CT ABD & PELVIS W/CONTRAST: CPT

## 2020-10-07 PROCEDURE — 86803 HEPATITIS C AB TEST: CPT

## 2020-10-07 PROCEDURE — 81207 BCR/ABL1 GENE MINOR BP: CPT

## 2020-10-07 PROCEDURE — 99239 HOSP IP/OBS DSCHRG MGMT >30: CPT

## 2020-10-07 PROCEDURE — U0003: CPT

## 2020-10-07 PROCEDURE — A9537: CPT

## 2020-10-07 PROCEDURE — 93005 ELECTROCARDIOGRAM TRACING: CPT

## 2020-10-07 PROCEDURE — 88271 CYTOGENETICS DNA PROBE: CPT

## 2020-10-07 PROCEDURE — 85610 PROTHROMBIN TIME: CPT

## 2020-10-07 PROCEDURE — 76705 ECHO EXAM OF ABDOMEN: CPT

## 2020-10-07 PROCEDURE — 88237 TISSUE CULTURE BONE MARROW: CPT

## 2020-10-07 PROCEDURE — 86769 SARS-COV-2 COVID-19 ANTIBODY: CPT

## 2020-10-07 PROCEDURE — 86901 BLOOD TYPING SEROLOGIC RH(D): CPT

## 2020-10-07 PROCEDURE — 96375 TX/PRO/DX INJ NEW DRUG ADDON: CPT

## 2020-10-07 PROCEDURE — 86900 BLOOD TYPING SEROLOGIC ABO: CPT

## 2020-10-07 PROCEDURE — 88360 TUMOR IMMUNOHISTOCHEM/MANUAL: CPT

## 2020-10-07 PROCEDURE — 71045 X-RAY EXAM CHEST 1 VIEW: CPT

## 2020-10-07 PROCEDURE — 88341 IMHCHEM/IMCYTCHM EA ADD ANTB: CPT

## 2020-10-07 PROCEDURE — 84484 ASSAY OF TROPONIN QUANT: CPT

## 2020-10-07 PROCEDURE — 80048 BASIC METABOLIC PNL TOTAL CA: CPT

## 2020-10-07 RX ORDER — ENOXAPARIN SODIUM 100 MG/ML
40 INJECTION SUBCUTANEOUS DAILY
Refills: 0 | Status: DISCONTINUED | OUTPATIENT
Start: 2020-10-07 | End: 2020-10-07

## 2020-10-07 NOTE — DISCHARGE NOTE PROVIDER - PROVIDER TOKENS
PROVIDER:[TOKEN:[[id number]],FOLLOWUP:[2 weeks]],PROVIDER:[TOKEN:[[id number]],FOLLOWUP:[1 week]]

## 2020-10-07 NOTE — DISCHARGE NOTE NURSING/CASE MANAGEMENT/SOCIAL WORK - NSDCPETBCESMAN_GEN_ALL_CORE
Please keep area clean and dry  Watch for signs of infection    Stitches Removal   WHAT YOU NEED TO KNOW:   Stitches may need to be removed in 3 to 14 days depending on the location of your wound  Your healthcare provider will use sterile forceps or tweezers to  the knot of each stitch  He will cut the stitch with scissors and pull the stitch out  You may feel a slight tug as the stitch comes out  He may place small steristrips across your wound after the stitches have been removed  These pieces of tape will peel and fall of on their own  Do not pull them off  DISCHARGE INSTRUCTIONS:   Return to the emergency department if:   · Your wound splits open  · You suddenly cannot move your injured joint  · You have sudden numbness around your wound  · You see red streaks coming from your wound  Contact your healthcare provider if:   · You have a fever and chills  · Your wound is red, warm, swollen, or leaking pus  · There is a bad smell coming from your wound  · You have increased pain in the wound area  · You have questions or concerns about your condition or care  Care for your wound:   · Clean your wound as directed  Carefully wash your wound with soap and water  Pat the area dry with a clean towel  · Protect your wound  Your wound can swell, bleed, or split open if it is stretched or bumped  You may need to wear a bandage that supports your wound until it is completely healed  · Minimize your scar  Use sunblock if your wound is exposed to the sun  Apply it every day after the stitches are removed  This will help prevent skin discoloration  Follow up with your healthcare provider as directed: You may need to return in 3 to 5 days if the stitches are on your face  Stitches on your scalp need to be removed after 7 to 14 days  Stitches over joints may remain in place up to 14 days  Write down your questions so you remember to ask them during your visits     © 2017 [facility] is for End User's use only and may not be sold, redistributed or otherwise used for commercial purposes  All illustrations and images included in CareNotes® are the copyrighted property of A D A M , Inc  or Jorge Patten  The above information is an  only  It is not intended as medical advice for individual conditions or treatments  Talk to your doctor, nurse or pharmacist before following any medical regimen to see if it is safe and effective for you  If you are a smoker, it is important for your health to stop smoking. Please be aware that second hand smoke is also harmful.

## 2020-10-07 NOTE — DISCHARGE NOTE PROVIDER - NSDCCPCAREPLAN_GEN_ALL_CORE_FT
PRINCIPAL DISCHARGE DIAGNOSIS  Diagnosis: Chronic lymphocytic leukemia  Assessment and Plan of Treatment: - Please follow up with Dr. Antony in 1 week      SECONDARY DISCHARGE DIAGNOSES  Diagnosis: Acute cholecystitis  Assessment and Plan of Treatment: - Please follow  up with Dr. Arevalo in 2 weeks

## 2020-10-07 NOTE — PROGRESS NOTE ADULT - ASSESSMENT
56 y/o M POD#1 s/p lap giorgio for acute cholecystitis. Clinically stable, afebrile. No immediate postop complications.   - pain control as needed  - reg diet  - OOB, spirometry  - dvt ppx   - may discharge pt home from surgical stand point  - rest of care per primary team 54 y/o M POD#1 s/p lap giorgio for acute cholecystitis. Clinically stable, afebrile. No immediate postop complications.   - pain control as needed  - reg diet  - OOB, spirometry  - dvt ppx   - may discharge pt home from surgical stand point  - rest of care per primary team     Surgery will sign off at this time. Patient can follow-up with Dr. Arevalo in 2-weeks post discharge for wound check. Steri strips to remain in place and will fall off on own. Okay to shower. Avoid heavy lifting greater than 15lbs for 4-6 weeks. Please call surgery team with any questions.

## 2020-10-07 NOTE — DISCHARGE NOTE NURSING/CASE MANAGEMENT/SOCIAL WORK - NSDCFUADDAPPT_GEN_ALL_CORE_FT
Please follow up with Dr. Antony (Hematology) in 1 week  Please follow up with Dr. Arevalo (Surgery) in 2 weeks

## 2020-10-07 NOTE — DISCHARGE NOTE PROVIDER - CARE PROVIDER_API CALL
Isaiah Arevalo  SURGERY  [address], [address]  [place] [address]  Phone: [phone]  Fax: [phone]  Follow Up Time: 2 weeks    Letty Mehta  MEDICAL ONCOLOGY  [facility], [address] [place] [address]  Phone: [phone]  Fax: [phone]  Follow Up Time: 1 week

## 2020-10-07 NOTE — DISCHARGE NOTE NURSING/CASE MANAGEMENT/SOCIAL WORK - PATIENT PORTAL LINK FT
You can access the FollowMyHealth Patient Portal offered by [facility] by registering at the following website: [email or web address]. By joining [employer]’s FollowMyHealth portal, you will also be able to view your health information using other applications (apps) compatible with our system.

## 2020-10-07 NOTE — PROGRESS NOTE ADULT - REASON FOR ADMISSION
abdominal pain and leukocytosis

## 2020-10-07 NOTE — PROGRESS NOTE ADULT - SUBJECTIVE AND OBJECTIVE BOX
POST OP/PROGRESS NOTE    INTERVAL HPI/OVERNIGHT EVENTS: pt went to OR for laparoscopic cholecystectomy     SUBJECTIVE:  Seen and examined at bedside  Reports feeling well, postop pain under control  Tolerating diet w/o N/V  no fever     MEDICATIONS  (STANDING):  enoxaparin Injectable 40 milliGRAM(s) SubCutaneous daily  sodium chloride 0.9%. 1000 milliLiter(s) (75 mL/Hr) IV Continuous <Continuous>    MEDICATIONS  (PRN):  acetaminophen   Tablet .. 650 milliGRAM(s) Oral every 6 hours PRN Temp greater or equal to 38C (100.4F), Mild Pain (1 - 3)  aluminum hydroxide/magnesium hydroxide/simethicone Suspension 30 milliLiter(s) Oral every 4 hours PRN Dyspepsia  oxyCODONE    IR 5 milliGRAM(s) Oral every 4 hours PRN Severe Pain (7 - 10)  traMADol 50 milliGRAM(s) Oral every 6 hours PRN Moderate Pain (4 - 6)      Vital Signs Last 24 Hrs  T(C): 36.9 (07 Oct 2020 00:20), Max: 37.6 (06 Oct 2020 20:00)  T(F): 98.4 (07 Oct 2020 00:20), Max: 99.6 (06 Oct 2020 20:00)  HR: 80 (07 Oct 2020 00:20) (72 - 102)  BP: 121/72 (07 Oct 2020 00:20) (121/72 - 164/71)  BP(mean): 93 (06 Oct 2020 19:00) (93 - 101)  RR: 18 (07 Oct 2020 00:20) (12 - 18)  SpO2: 98% (07 Oct 2020 00:20) (96% - 100%)    PE  Gen: NAD  CV: RRR  Abd: soft, NTND, incisions C/D/I with steri strips in place  Ext: SCD in place  Neuro: GCS 15      I&O's Detail    06 Oct 2020 07:01  -  07 Oct 2020 02:55  --------------------------------------------------------  IN:    Oral Fluid: 240 mL  Total IN: 240 mL    OUT:  Total OUT: 0 mL    Total NET: 240 mL           POST OP/PROGRESS NOTE    INTERVAL HPI/OVERNIGHT EVENTS: pt went to OR for laparoscopic cholecystectomy, uncomplicated. Patient doing well this morning. Pain well controlled. Tolerating a diet without issue. Voiding appropriately. AVSS.     SUBJECTIVE:  Seen and examined at bedside  Reports feeling well, postop pain under control  Tolerating diet w/o N/V  no fever     MEDICATIONS  (STANDING):  enoxaparin Injectable 40 milliGRAM(s) SubCutaneous daily  sodium chloride 0.9%. 1000 milliLiter(s) (75 mL/Hr) IV Continuous <Continuous>    MEDICATIONS  (PRN):  acetaminophen   Tablet .. 650 milliGRAM(s) Oral every 6 hours PRN Temp greater or equal to 38C (100.4F), Mild Pain (1 - 3)  aluminum hydroxide/magnesium hydroxide/simethicone Suspension 30 milliLiter(s) Oral every 4 hours PRN Dyspepsia  oxyCODONE    IR 5 milliGRAM(s) Oral every 4 hours PRN Severe Pain (7 - 10)  traMADol 50 milliGRAM(s) Oral every 6 hours PRN Moderate Pain (4 - 6)      Vital Signs Last 24 Hrs  T(C): 36.9 (07 Oct 2020 00:20), Max: 37.6 (06 Oct 2020 20:00)  T(F): 98.4 (07 Oct 2020 00:20), Max: 99.6 (06 Oct 2020 20:00)  HR: 80 (07 Oct 2020 00:20) (72 - 102)  BP: 121/72 (07 Oct 2020 00:20) (121/72 - 164/71)  BP(mean): 93 (06 Oct 2020 19:00) (93 - 101)  RR: 18 (07 Oct 2020 00:20) (12 - 18)  SpO2: 98% (07 Oct 2020 00:20) (96% - 100%)    PE  Gen: NAD  CV: RRR  Abd: soft, NTND, incisions C/D/I with steri strips in place  Ext: SCD in place  Neuro: GCS 15      I&O's Detail    06 Oct 2020 07:01  -  07 Oct 2020 02:55  --------------------------------------------------------  IN:    Oral Fluid: 240 mL  Total IN: 240 mL    OUT:  Total OUT: 0 mL    Total NET: 240 mL

## 2020-10-07 NOTE — DISCHARGE NOTE PROVIDER - HOSPITAL COURSE
54 y/o M with no PMH presented with complaints of abdominal pain. CT abdomen pelvis was performed showing diffuse nonspecific gallbladder wall edema may represent primary gallbladder pathology, secondary to chronic liver disease or systemic disease and 16cm splenomegaly. Gallbladder ultrasound was performed showing cholelithiasis and gallbladder wall thickening with normal caliber CBD. HIDA scan was performed consistent with acute cholecystitis. The patient was started on Zosyn and Surgery was consulted. Patient underwent laparoscopic cholecystectomy on 10/6/2020.    The patient was found to have a WBC of 90,000. Hematology was consulted and after review of the peripheral smear, diagnosis most consistent with CLL. As per Hematology recommendations, patient will follow up as an outpatient.     The patient is hemodynamically stable and medically optimized for discharge with appropriate follow up.      Discharge Physical Examination:  Vital Signs Last 24 Hrs  T(F): 98.2 (07 Oct 2020 08:36), Max: 99.6 (06 Oct 2020 20:00)  HR: 71 (07 Oct 2020 08:36) (71 - 102)  BP: 149/80 (07 Oct 2020 08:36) (121/72 - 164/71)  RR: 18 (07 Oct 2020 08:36) (12 - 18)  SpO2: 98% (07 Oct 2020 08:36) (96% - 100%)    Physical Exam:  Constitutional: alert and oriented, in no acute distress   Neck: Soft and supple, No LAD, No JVD  Respiratory: Clear to auscultation bilaterally, no wheezes or crackles  Cardiovascular: Regular rate and rhythm, no murmurs, gallops, rubs  Gastrointestinal: Soft, non-tender to palpation, +bs  Vascular: 2+ peripheral pulses  Neurological: A/O x 3, no focal neurological deficits  Musculoskeletal: 5/5 strength b/l upper and lower extremities, no lower extremity edema bilaterally    Time spent on discharge: 35 minutes

## 2020-10-08 PROBLEM — Z78.9 OTHER SPECIFIED HEALTH STATUS: Chronic | Status: ACTIVE | Noted: 2020-10-04

## 2020-10-08 LAB
BCR/ABL BY RT - PCR QUANTITATIVE: SIGNIFICANT CHANGE UP
CHROM ANALY INTERPHASE BLD FISH-IMP: SIGNIFICANT CHANGE UP
JAK2 P.V617F BLD/T QL: SIGNIFICANT CHANGE UP

## 2020-10-08 NOTE — CDI QUERY NOTE - NSCDIOTHERTXTBX_GEN_ALL_CORE_HH
Admitted with T 101.8, P 132, WBC 74.17    H&P- Abdominal pain and Leukocytosis possible Leukemia vs Lymphoma  Hypercalcemia possibly from dehydration vs malignancy    Hem- Leukocytosis  -dx now most c/w CLL  Acute cholecystitis.     10/6 Hospitalist note-Chronic Lymphocytic Leukemia    Sepsis secondary to Acute cholecystitis  - CT abdomen/pelvis noted  - US Gallbladder: cholelithiasis and gallbladder wall thickening. CBD caliber normal  - HIDA scan noted acute cholecystitis  - c/w Zosyn     Please clarify the status of sepsis    1) After study, sepsis ruled out  2) Sepsis present on admission, resolved  30 Other  4) Not clinically significant

## 2020-10-10 ENCOUNTER — OUTPATIENT (OUTPATIENT)
Dept: OUTPATIENT SERVICES | Facility: HOSPITAL | Age: 55
LOS: 1 days | Discharge: HOME | End: 2020-10-10

## 2020-10-10 DIAGNOSIS — C91.10 CHRONIC LYMPHOCYTIC LEUKEMIA OF B-CELL TYPE NOT HAVING ACHIEVED REMISSION: ICD-10-CM

## 2020-10-11 LAB
CULTURE RESULTS: SIGNIFICANT CHANGE UP
CULTURE RESULTS: SIGNIFICANT CHANGE UP
SPECIMEN SOURCE: SIGNIFICANT CHANGE UP
SPECIMEN SOURCE: SIGNIFICANT CHANGE UP

## 2020-10-14 ENCOUNTER — RESULT REVIEW (OUTPATIENT)
Age: 55
End: 2020-10-14

## 2020-10-14 ENCOUNTER — APPOINTMENT (OUTPATIENT)
Dept: HEMATOLOGY ONCOLOGY | Facility: CLINIC | Age: 55
End: 2020-10-14
Payer: COMMERCIAL

## 2020-10-14 ENCOUNTER — TRANSCRIPTION ENCOUNTER (OUTPATIENT)
Age: 55
End: 2020-10-14

## 2020-10-14 VITALS
HEIGHT: 70 IN | OXYGEN SATURATION: 99 % | TEMPERATURE: 97.8 F | HEART RATE: 76 BPM | BODY MASS INDEX: 25.05 KG/M2 | SYSTOLIC BLOOD PRESSURE: 142 MMHG | WEIGHT: 175 LBS | DIASTOLIC BLOOD PRESSURE: 84 MMHG

## 2020-10-14 DIAGNOSIS — Z80.7 FAMILY HISTORY OF OTHER MALIGNANT NEOPLASMS OF LYMPHOID, HEMATOPOIETIC AND RELATED TISSUES: ICD-10-CM

## 2020-10-14 LAB
ANISOCYTOSIS BLD QL: SLIGHT — SIGNIFICANT CHANGE UP
BASOPHILS # BLD AUTO: SIGNIFICANT CHANGE UP (ref 0–0.2)
BASOPHILS NFR BLD AUTO: 0 % — SIGNIFICANT CHANGE UP (ref 0–2)
EOSINOPHIL # BLD AUTO: 0.4 K/UL — SIGNIFICANT CHANGE UP (ref 0–0.5)
EOSINOPHIL NFR BLD AUTO: 0.5 % — SIGNIFICANT CHANGE UP (ref 0–6)
HCT VFR BLD CALC: 40.4 % — SIGNIFICANT CHANGE UP (ref 39–50)
HGB BLD-MCNC: 12.8 G/DL — LOW (ref 13–17)
LYMPHOCYTES # BLD AUTO: 58.6 K/UL — HIGH (ref 1–3.3)
LYMPHOCYTES # BLD AUTO: 88 % — HIGH (ref 13–44)
LYMPHOCYTES # SPEC AUTO: 3 % — HIGH (ref 0–0)
MACROCYTES BLD QL: SLIGHT — SIGNIFICANT CHANGE UP
MCHC RBC-ENTMCNC: 27.6 PG — SIGNIFICANT CHANGE UP (ref 27–34)
MCHC RBC-ENTMCNC: 31.7 G/DL — LOW (ref 32–36)
MCV RBC AUTO: 87.1 FL — SIGNIFICANT CHANGE UP (ref 80–100)
MICROCYTES BLD QL: SLIGHT — SIGNIFICANT CHANGE UP
MONOCYTES # BLD AUTO: 0.8 K/UL — SIGNIFICANT CHANGE UP (ref 0–0.9)
MONOCYTES NFR BLD AUTO: 4 % — SIGNIFICANT CHANGE UP (ref 2–14)
NEUTROPHILS # BLD AUTO: 5.8 K/UL — SIGNIFICANT CHANGE UP (ref 1.8–7.4)
NEUTROPHILS NFR BLD AUTO: 3 % — SIGNIFICANT CHANGE UP (ref 43–77)
NEUTS BAND # BLD: 1 % — SIGNIFICANT CHANGE UP (ref 0–8)
NRBC # BLD: 3 /100 — HIGH (ref 0–0)
PLAT MORPH BLD: NORMAL — SIGNIFICANT CHANGE UP
PLATELET # BLD AUTO: 279 K/UL — SIGNIFICANT CHANGE UP (ref 150–400)
POIKILOCYTOSIS BLD QL AUTO: SLIGHT — SIGNIFICANT CHANGE UP
RBC # BLD: 4.64 M/UL — SIGNIFICANT CHANGE UP (ref 4.2–5.8)
RBC # FLD: 12 % — SIGNIFICANT CHANGE UP (ref 10.3–14.5)
RBC BLD AUTO: SIGNIFICANT CHANGE UP
SMUDGE CELLS # BLD: PRESENT — SIGNIFICANT CHANGE UP
WBC # BLD: 66.9 K/UL — CRITICAL HIGH (ref 3.8–10.5)
WBC # FLD AUTO: 66.9 K/UL — CRITICAL HIGH (ref 3.8–10.5)

## 2020-10-14 PROCEDURE — 99214 OFFICE O/P EST MOD 30 MIN: CPT

## 2020-10-16 LAB — SURGICAL PATHOLOGY STUDY: SIGNIFICANT CHANGE UP

## 2020-10-23 NOTE — RESULTS/DATA
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%)\par \par Probe(s) and Location(s): MYB (6q23.2-6q23.3), JOSE (11q22.3), CCND1 (11q13.3), IGH (14q32.3), CEP\par 12/D12Z3 (12p11.1-q11), P52S962 (13q14.3), JOS56p97/LAMP1 (13q34), TP53 (17p13.1)\par \par ISCN Nomenclature: nuc enoch(MYB,JOSE,TP53)x2 {194/200 },\par (Q58B5h9,Q63W973b8,GIB55x10u5) {167/200 },(CCND1,IGH)x2 {194/200 }\par \par \par Peripheral blood:\par      - Monotypic B-cells (80% of cells), positive for dim kappa, CD19, dim CD20, CD23, CD5, minimal\par dim FMC-7; negative CD10, CD38, consistent with small lymphocytic lymphoma/chronic lymphocytic\par leukemia (CD38 negative).\par \par Please see interpretation.\par \par INTERPRETATION:\par MORPHOLOGY: Absolute lymphocytosis, predominantly small cells, with increased smudge cells.\par \par IMMUNOPHENOTYPE: _ Monotypic B-cells (80% of cells), positive for dim kappa, CD19, dim CD20, CD23,\par CD5, minimal dim FMC-7; negative CD10, CD38.\par \par 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

## 2020-10-23 NOTE — HISTORY OF PRESENT ILLNESS
[de-identified] :  54 yo M with no PMHx who presented to the ED on 10/4/20  for abdominal pain. \par The night prior he drank about 4 old beers and ate a bunch of Pizza, he developed an upset stomach but it resolved after he had a BM.\par He developed abdominal discomfort. which was associated with nausea and a small amount of vomiting. \par Pain is located in the right upper and middle abdomen and does not radiate. Patient was found to have a WBC count of\par  69K with 85% Lymphocytes, Hb \par CT abdomen pelvis with gall bladder wall edema. Patient had a cholecystectomy on 10/6/20 \par Cystic duct LYmph node with SLL/ CLL \par  His mothers has a Hx fo NHL. He denies diarrhea and constipation. \par \par Labs C/w WBC count of 69K Hb 13.7 Plt 128 Absolute LYmphocyte 87% CLL Panel in the hospital  with Del 13 q\par \par  [de-identified] : Patient presents with wife today \par He feels well denies any fevers / chills/ no Nausea/ vomiting \par Appetite good \par no bruising\par Abdominal Pain and discomfort improved post cholecystectomy \par \par S/p Cholecystectomy on 10/6/20 \par \par Today WBC COUNT of 66.9 K, Hb 12.8, Plt 279, Ab LYmp count 58K ANC 5.8 K \par No Night sweats/ infections/ B symptoms

## 2020-10-26 LAB
ALBUMIN SERPL ELPH-MCNC: 4.6 G/DL
ALP BLD-CCNC: 65 U/L
ALT SERPL-CCNC: 18 U/L
ANION GAP SERPL CALC-SCNC: 18 MMOL/L
AST SERPL-CCNC: 15 U/L
B2 MICROGLOB SERPL-MCNC: 2.8 MG/L
BILIRUB SERPL-MCNC: 0.2 MG/DL
BUN SERPL-MCNC: 22 MG/DL
CALCIUM SERPL-MCNC: 9.7 MG/DL
CHLORIDE SERPL-SCNC: 106 MMOL/L
CO2 SERPL-SCNC: 20 MMOL/L
CREAT SERPL-MCNC: 1.35 MG/DL
DEPRECATED KAPPA LC FREE/LAMBDA SER: 3.82 RATIO
FERRITIN SERPL-MCNC: 340 NG/ML
FOLATE SERPL-MCNC: 10.1 NG/ML
GLUCOSE SERPL-MCNC: 107 MG/DL
HAPTOGLOB SERPL-MCNC: <20 MG/DL
IGA SER QL IEP: 76 MG/DL
IGG SER QL IEP: 545 MG/DL
IGM SER QL IEP: 25 MG/DL
IRON SATN MFR SERPL: 18 %
IRON SERPL-MCNC: 54 UG/DL
KAPPA LC CSF-MCNC: 0.73 MG/DL
KAPPA LC SERPL-MCNC: 2.79 MG/DL
LDH SERPL-CCNC: 249 U/L
POTASSIUM SERPL-SCNC: 4.7 MMOL/L
PROT SERPL-MCNC: 6.3 G/DL
RBC # BLD: 4.55 M/UL
RETICS # AUTO: 2.6 %
RETICS AGGREG/RBC NFR: 119.7 K/UL
SODIUM SERPL-SCNC: 143 MMOL/L
TIBC SERPL-MCNC: 295 UG/DL
UIBC SERPL-MCNC: 242 UG/DL
URATE SERPL-MCNC: 7.2 MG/DL
VIT B12 SERPL-MCNC: 714 PG/ML

## 2020-11-25 ENCOUNTER — APPOINTMENT (OUTPATIENT)
Dept: HEMATOLOGY ONCOLOGY | Facility: CLINIC | Age: 55
End: 2020-11-25

## 2020-12-22 ENCOUNTER — APPOINTMENT (OUTPATIENT)
Dept: FAMILY MEDICINE | Facility: CLINIC | Age: 55
End: 2020-12-22
Payer: COMMERCIAL

## 2020-12-22 ENCOUNTER — NON-APPOINTMENT (OUTPATIENT)
Age: 55
End: 2020-12-22

## 2020-12-22 VITALS
HEIGHT: 70 IN | DIASTOLIC BLOOD PRESSURE: 80 MMHG | BODY MASS INDEX: 25.62 KG/M2 | HEART RATE: 63 BPM | TEMPERATURE: 96.5 F | SYSTOLIC BLOOD PRESSURE: 120 MMHG | WEIGHT: 179 LBS | OXYGEN SATURATION: 99 %

## 2020-12-22 DIAGNOSIS — Z84.89 FAMILY HISTORY OF OTHER SPECIFIED CONDITIONS: ICD-10-CM

## 2020-12-22 DIAGNOSIS — Z81.8 FAMILY HISTORY OF OTHER MENTAL AND BEHAVIORAL DISORDERS: ICD-10-CM

## 2020-12-22 PROCEDURE — 99072 ADDL SUPL MATRL&STAF TM PHE: CPT

## 2020-12-22 PROCEDURE — 81003 URINALYSIS AUTO W/O SCOPE: CPT | Mod: QW

## 2020-12-22 PROCEDURE — 93000 ELECTROCARDIOGRAM COMPLETE: CPT

## 2020-12-22 PROCEDURE — 99386 PREV VISIT NEW AGE 40-64: CPT | Mod: 25

## 2020-12-24 LAB
BILIRUB UR QL STRIP: NORMAL
GLUCOSE UR-MCNC: NORMAL
HCG UR QL: 0.2 EU/DL
HGB UR QL STRIP.AUTO: NORMAL
KETONES UR-MCNC: NORMAL
LEUKOCYTE ESTERASE UR QL STRIP: NORMAL
NITRITE UR QL STRIP: NORMAL
PH UR STRIP: 5.5
PROT UR STRIP-MCNC: NORMAL
SP GR UR STRIP: >=1.03

## 2020-12-24 NOTE — PHYSICAL EXAM
[No Acute Distress] : no acute distress [Well Nourished] : well nourished [Well Developed] : well developed [Well-Appearing] : well-appearing [Normal Sclera/Conjunctiva] : normal sclera/conjunctiva [PERRL] : pupils equal round and reactive to light [EOMI] : extraocular movements intact [Normal Outer Ear/Nose] : the outer ears and nose were normal in appearance [Normal Oropharynx] : the oropharynx was normal [No JVD] : no jugular venous distention [No Lymphadenopathy] : no lymphadenopathy [Supple] : supple [Thyroid Normal, No Nodules] : the thyroid was normal and there were no nodules present [No Respiratory Distress] : no respiratory distress  [No Accessory Muscle Use] : no accessory muscle use [Clear to Auscultation] : lungs were clear to auscultation bilaterally [Normal Rate] : normal rate  [Regular Rhythm] : with a regular rhythm [Normal S1, S2] : normal S1 and S2 [No Murmur] : no murmur heard [No Carotid Bruits] : no carotid bruits [No Abdominal Bruit] : a ~M bruit was not heard ~T in the abdomen [Pedal Pulses Present] : the pedal pulses are present [No Edema] : there was no peripheral edema [No Palpable Aorta] : no palpable aorta [No Extremity Clubbing/Cyanosis] : no extremity clubbing/cyanosis [Soft] : abdomen soft [Non Tender] : non-tender [Non-distended] : non-distended [No Masses] : no abdominal mass palpated [No HSM] : no HSM [Normal Bowel Sounds] : normal bowel sounds [No CVA Tenderness] : no CVA  tenderness [No Spinal Tenderness] : no spinal tenderness [No Joint Swelling] : no joint swelling [Grossly Normal Strength/Tone] : grossly normal strength/tone [No Rash] : no rash [Coordination Grossly Intact] : coordination grossly intact [No Focal Deficits] : no focal deficits [Normal Gait] : normal gait [Normal Affect] : the affect was normal [Normal Insight/Judgement] : insight and judgment were intact

## 2020-12-24 NOTE — HISTORY OF PRESENT ILLNESS
[FreeTextEntry1] : NPA\par Pt here for CPE [de-identified] : Mr. MAGGI SCHAEFER is a 55 year male who presents to office as a new patient to establish care and have an initial CPE. Patient was recently diagnosed with CLL, she is following with MD at [facility]. Patient feels well. He has not had screening LDL, A1c and TSH in recent past. Has frequent CBC and CMP with [facility].

## 2020-12-24 NOTE — HEALTH RISK ASSESSMENT
[Very Good] : ~his/her~ current health as very good [Excellent] : ~his/her~  mood as  excellent [Yes] : Yes [Monthly or less (1 pt)] : Monthly or less (1 point) [1 or 2 (0 pts)] : 1 or 2 (0 points) [Less than monthly (1 pt)] : Less than monthly (1 point) [No] : In the past 12 months have you used drugs other than those required for medical reasons? No [No falls in past year] : Patient reported no falls in the past year [0] : 2) Feeling down, depressed, or hopeless: Not at all (0) [None] : None [With Significant Other] : lives with significant other [# of Members in Household ___] :  household currently consist of [unfilled] member(s) [Employed] : employed [] :  [# Of Children ___] : has [unfilled] children [Feels Safe at Home] : Feels safe at home [Fully functional (bathing, dressing, toileting, transferring, walking, feeding)] : Fully functional (bathing, dressing, toileting, transferring, walking, feeding) [Fully functional (using the telephone, shopping, preparing meals, housekeeping, doing laundry, using] : Fully functional and needs no help or supervision to perform IADLs (using the telephone, shopping, preparing meals, housekeeping, doing laundry, using transportation, managing medications and managing finances) [] : No [Audit-CScore] : 2 [BQO2Pmxmd] : 0 [Change in mental status noted] : No change in mental status noted [Reports changes in hearing] : Reports no changes in hearing [Reports changes in vision] : Reports no changes in vision [Reports changes in dental health] : Reports no changes in dental health [FreeTextEntry2] : middle   [Patient/Caregiver not ready to engage] : Patient/Caregiver not ready to engage [AdvancecareDate] : 12/2020

## 2021-01-06 LAB
25(OH)D3 SERPL-MCNC: 28.1 NG/ML
ALBUMIN SERPL ELPH-MCNC: 4.5 G/DL
ALP BLD-CCNC: 62 U/L
ALT SERPL-CCNC: 23 U/L
ANION GAP SERPL CALC-SCNC: 9 MMOL/L
AST SERPL-CCNC: 21 U/L
BILIRUB SERPL-MCNC: 0.4 MG/DL
BUN SERPL-MCNC: 23 MG/DL
CALCIUM SERPL-MCNC: 9.2 MG/DL
CHLORIDE SERPL-SCNC: 108 MMOL/L
CHOLEST SERPL-MCNC: 156 MG/DL
CO2 SERPL-SCNC: 26 MMOL/L
CREAT SERPL-MCNC: 1.28 MG/DL
ESTIMATED AVERAGE GLUCOSE: 97 MG/DL
GLUCOSE SERPL-MCNC: 107 MG/DL
HBA1C MFR BLD HPLC: 5 %
HDLC SERPL-MCNC: 69 MG/DL
LDLC SERPL CALC-MCNC: 80 MG/DL
NONHDLC SERPL-MCNC: 87 MG/DL
POTASSIUM SERPL-SCNC: 4.3 MMOL/L
PROT SERPL-MCNC: 6.1 G/DL
SODIUM SERPL-SCNC: 143 MMOL/L
TRIGL SERPL-MCNC: 37 MG/DL
TSH SERPL-ACNC: 2.87 UIU/ML

## 2021-07-08 DIAGNOSIS — Z23 ENCOUNTER FOR IMMUNIZATION: ICD-10-CM

## 2022-02-22 ENCOUNTER — APPOINTMENT (OUTPATIENT)
Dept: FAMILY MEDICINE | Facility: CLINIC | Age: 57
End: 2022-02-22
Payer: COMMERCIAL

## 2022-02-22 ENCOUNTER — TRANSCRIPTION ENCOUNTER (OUTPATIENT)
Age: 57
End: 2022-02-22

## 2022-02-22 ENCOUNTER — LABORATORY RESULT (OUTPATIENT)
Age: 57
End: 2022-02-22

## 2022-02-22 ENCOUNTER — NON-APPOINTMENT (OUTPATIENT)
Age: 57
End: 2022-02-22

## 2022-02-22 VITALS
BODY MASS INDEX: 24.62 KG/M2 | SYSTOLIC BLOOD PRESSURE: 139 MMHG | HEIGHT: 70 IN | WEIGHT: 172 LBS | HEART RATE: 96 BPM | TEMPERATURE: 98 F | DIASTOLIC BLOOD PRESSURE: 90 MMHG

## 2022-02-22 LAB
BILIRUB UR QL STRIP: NORMAL
GLUCOSE UR-MCNC: NORMAL
HCG UR QL: 0.2 EU/DL
HGB UR QL STRIP.AUTO: ABNORMAL
KETONES UR-MCNC: NORMAL
LEUKOCYTE ESTERASE UR QL STRIP: NORMAL
NITRITE UR QL STRIP: NORMAL
PH UR STRIP: 6
PROT UR STRIP-MCNC: NORMAL
SP GR UR STRIP: 1.02

## 2022-02-22 PROCEDURE — 99396 PREV VISIT EST AGE 40-64: CPT | Mod: 25

## 2022-02-22 PROCEDURE — 93000 ELECTROCARDIOGRAM COMPLETE: CPT

## 2022-02-22 PROCEDURE — 0013A: CPT

## 2022-02-22 PROCEDURE — 81003 URINALYSIS AUTO W/O SCOPE: CPT | Mod: QW

## 2022-02-23 LAB
25(OH)D3 SERPL-MCNC: 61.9 NG/ML
ALBUMIN SERPL ELPH-MCNC: 4.9 G/DL
ALP BLD-CCNC: 70 U/L
ALT SERPL-CCNC: 10 U/L
ANION GAP SERPL CALC-SCNC: 17 MMOL/L
AST SERPL-CCNC: 15 U/L
BILIRUB SERPL-MCNC: 0.6 MG/DL
BUN SERPL-MCNC: 19 MG/DL
CALCIUM SERPL-MCNC: 9.4 MG/DL
CHLORIDE SERPL-SCNC: 104 MMOL/L
CHOLEST SERPL-MCNC: 160 MG/DL
CO2 SERPL-SCNC: 21 MMOL/L
CREAT SERPL-MCNC: 1.37 MG/DL
ESTIMATED AVERAGE GLUCOSE: 97 MG/DL
GLUCOSE SERPL-MCNC: 96 MG/DL
HBA1C MFR BLD HPLC: 5 %
HDLC SERPL-MCNC: 64 MG/DL
LDLC SERPL CALC-MCNC: 85 MG/DL
NONHDLC SERPL-MCNC: 96 MG/DL
POTASSIUM SERPL-SCNC: 4.6 MMOL/L
PROT SERPL-MCNC: 6.2 G/DL
PSA SERPL-MCNC: 0.8 NG/ML
SODIUM SERPL-SCNC: 142 MMOL/L
TRIGL SERPL-MCNC: 53 MG/DL
TSH SERPL-ACNC: 2.17 UIU/ML

## 2022-02-24 LAB
BASOPHILS # BLD AUTO: 0 K/UL
BASOPHILS NFR BLD AUTO: 0 %
EOSINOPHIL # BLD AUTO: 0 K/UL
EOSINOPHIL NFR BLD AUTO: 0 %
HCT VFR BLD CALC: 45.1 %
HGB BLD-MCNC: 13.5 G/DL
LYMPHOCYTES # BLD AUTO: 95.53 K/UL
LYMPHOCYTES NFR BLD AUTO: 95 %
MAN DIFF?: NORMAL
MCHC RBC-ENTMCNC: 27.1 PG
MCHC RBC-ENTMCNC: 29.9 GM/DL
MCV RBC AUTO: 90.4 FL
MONOCYTES # BLD AUTO: 0 K/UL
MONOCYTES NFR BLD AUTO: 0 %
NEUTROPHILS # BLD AUTO: 3.02 K/UL
NEUTROPHILS NFR BLD AUTO: 3 %
PLATELET # BLD AUTO: 103 K/UL
RBC # BLD: 4.99 M/UL
RBC # FLD: 13.4 %
WBC # FLD AUTO: 100.56 K/UL

## 2022-02-27 RX ORDER — UBIDECARENONE/VIT E ACET 100MG-5
CAPSULE ORAL
Refills: 0 | Status: ACTIVE | COMMUNITY

## 2022-02-27 NOTE — HEALTH RISK ASSESSMENT
[Fully functional (bathing, dressing, toileting, transferring, walking, feeding)] : Fully functional (bathing, dressing, toileting, transferring, walking, feeding) [Fully functional (using the telephone, shopping, preparing meals, housekeeping, doing laundry, using] : Fully functional and needs no help or supervision to perform IADLs (using the telephone, shopping, preparing meals, housekeeping, doing laundry, using transportation, managing medications and managing finances) [Never] : Never [Yes] : Yes [1 or 2 (0 pts)] : 1 or 2 (0 points) [Never (0 pts)] : Never (0 points) [No] : In the past 12 months have you used drugs other than those required for medical reasons? No [No falls in past year] : Patient reported no falls in the past year [0] : 2) Feeling down, depressed, or hopeless: Not at all (0) [PHQ-2 Negative - No further assessment needed] : PHQ-2 Negative - No further assessment needed [Good] : ~his/her~  mood as  good [2 - 4 times a month (2 pts)] : 2-4 times a month (2 points) [Audit-CScore] : 2 [GES2Uswma] : 0 [ColonoscopyDate] : 03/2021 [ColonoscopyComments] : WNL

## 2022-02-27 NOTE — PHYSICAL EXAM
[Well Nourished] : well nourished [Well Developed] : well developed [Well-Appearing] : well-appearing [Normal Sclera/Conjunctiva] : normal sclera/conjunctiva [PERRL] : pupils equal round and reactive to light [EOMI] : extraocular movements intact [Normal Outer Ear/Nose] : the outer ears and nose were normal in appearance [Normal Oropharynx] : the oropharynx was normal [No JVD] : no jugular venous distention [Supple] : supple [No Respiratory Distress] : no respiratory distress  [No Accessory Muscle Use] : no accessory muscle use [Clear to Auscultation] : lungs were clear to auscultation bilaterally [Normal Rate] : normal rate  [Regular Rhythm] : with a regular rhythm [Normal S1, S2] : normal S1 and S2 [No Murmur] : no murmur heard [No Abdominal Bruit] : a ~M bruit was not heard ~T in the abdomen [Pedal Pulses Present] : the pedal pulses are present [No Edema] : there was no peripheral edema [No Palpable Aorta] : no palpable aorta [No Extremity Clubbing/Cyanosis] : no extremity clubbing/cyanosis [Soft] : abdomen soft [Non Tender] : non-tender [Non-distended] : non-distended [No Masses] : no abdominal mass palpated [No HSM] : no HSM [Normal Bowel Sounds] : normal bowel sounds [No CVA Tenderness] : no CVA  tenderness [No Spinal Tenderness] : no spinal tenderness [No Joint Swelling] : no joint swelling [Grossly Normal Strength/Tone] : grossly normal strength/tone [No Rash] : no rash [Coordination Grossly Intact] : coordination grossly intact [No Focal Deficits] : no focal deficits [Normal Gait] : normal gait [Normal Affect] : the affect was normal [Normal Insight/Judgement] : insight and judgment were intact

## 2022-02-27 NOTE — HISTORY OF PRESENT ILLNESS
[FreeTextEntry1] : Patient presents in office today for CPE.  [de-identified] : Mr. MAGGI SCHAEFER is a 57 year male in office for his annual CPE. Patient is under care of Heme/Onc at [facility] for CLL. Patient reports he is well. He is requesting third dose of Covid-19.

## 2022-07-27 ENCOUNTER — NON-APPOINTMENT (OUTPATIENT)
Age: 57
End: 2022-07-27

## 2022-08-02 ENCOUNTER — APPOINTMENT (OUTPATIENT)
Dept: FAMILY MEDICINE | Facility: CLINIC | Age: 57
End: 2022-08-02

## 2022-08-02 PROCEDURE — 0013A: CPT

## 2022-10-19 ENCOUNTER — NON-APPOINTMENT (OUTPATIENT)
Age: 57
End: 2022-10-19

## 2022-10-27 ENCOUNTER — NON-APPOINTMENT (OUTPATIENT)
Age: 57
End: 2022-10-27

## 2022-10-27 ENCOUNTER — APPOINTMENT (OUTPATIENT)
Dept: NEPHROLOGY | Facility: CLINIC | Age: 57
End: 2022-10-27

## 2022-10-27 ENCOUNTER — LABORATORY RESULT (OUTPATIENT)
Age: 57
End: 2022-10-27

## 2022-10-27 VITALS
WEIGHT: 170 LBS | BODY MASS INDEX: 24.34 KG/M2 | DIASTOLIC BLOOD PRESSURE: 80 MMHG | HEIGHT: 70 IN | OXYGEN SATURATION: 99 % | HEART RATE: 74 BPM | SYSTOLIC BLOOD PRESSURE: 110 MMHG

## 2022-10-27 PROCEDURE — 99205 OFFICE O/P NEW HI 60 MIN: CPT

## 2022-10-28 LAB
APPEARANCE: CLEAR
BACTERIA: NEGATIVE
BILIRUBIN URINE: NEGATIVE
BLOOD URINE: NEGATIVE
COLOR: COLORLESS
GLUCOSE QUALITATIVE U: NEGATIVE
HYALINE CASTS: 0 /LPF
KETONES URINE: NEGATIVE
LEUKOCYTE ESTERASE URINE: NEGATIVE
MICROSCOPIC-UA: NORMAL
NITRITE URINE: NEGATIVE
PH URINE: 6.5
PROTEIN URINE: NEGATIVE
RED BLOOD CELLS URINE: 0 /HPF
SPECIFIC GRAVITY URINE: 1
SQUAMOUS EPITHELIAL CELLS: 0 /HPF
UROBILINOGEN URINE: NORMAL
WHITE BLOOD CELLS URINE: 0 /HPF

## 2022-10-30 LAB
CREAT 24H UR-MCNC: 2.1 G/24 H
CREAT 24H UR-MCNC: 2.1 G/24 H
CREAT ?TM UR-MCNC: 75 MG/DL
CREAT ?TM UR-MCNC: 75 MG/DL
CREAT SPEC-SCNC: 23 MG/DL
CREAT/PROT UR: NORMAL RATIO
PROT 24H UR-MRATE: 6 MG/DL
PROT ?TM UR-MCNC: 24 HR
PROT ?TM UR-MCNC: 24 HR
PROT UR-MCNC: 168 MG/24 H
PROT UR-MCNC: <4 MG/DL
SPECIMEN VOL 24H UR: 2800 ML
SPECIMEN VOL 24H UR: 2800 ML

## 2022-11-01 ENCOUNTER — OUTPATIENT (OUTPATIENT)
Dept: OUTPATIENT SERVICES | Facility: HOSPITAL | Age: 57
LOS: 1 days | End: 2022-11-01
Payer: COMMERCIAL

## 2022-11-01 ENCOUNTER — APPOINTMENT (OUTPATIENT)
Dept: ULTRASOUND IMAGING | Facility: CLINIC | Age: 57
End: 2022-11-01

## 2022-11-01 ENCOUNTER — NON-APPOINTMENT (OUTPATIENT)
Age: 57
End: 2022-11-01

## 2022-11-01 DIAGNOSIS — D89.2 HYPERGAMMAGLOBULINEMIA, UNSPECIFIED: ICD-10-CM

## 2022-11-01 PROCEDURE — 76775 US EXAM ABDO BACK WALL LIM: CPT

## 2022-11-01 PROCEDURE — 76775 US EXAM ABDO BACK WALL LIM: CPT | Mod: 26

## 2022-11-06 LAB
ALBUPE: 12.6 %
ALPHA1UPE: 29.2 %
ALPHA2UPE: 20.6 %
BETAUPE: 12.8 %
GAMMAUPE: 24.8 %
IGA 24H UR QL IFE: NORMAL
KAPPA LC 24H UR QL: NORMAL
PROT PATTERN 24H UR ELPH-IMP: NORMAL
PROT UR-MCNC: <4 MG/DL
PROT UR-MCNC: <4 MG/DL

## 2022-11-06 NOTE — HISTORY OF PRESENT ILLNESS
[FreeTextEntry1] :  57 year male pt with PMH of CLL referred from [facility] for evaluation of CKD and elevated KLC ratio.\par \par Pt had initially presented to [facility] ED in 10/2020 for abdominal pain after consuming a fatty meal with a few beers. Patient was found to have a WBC count of 69 k with 85% Lymphocytes. Also with Scr 1.6. CT abdomen pelvis with gall bladder wall edema. and splenomegaly 16 cm. Patient had a cholecystectomy on 10/6/20 . Cystic duct Lymph node demonstrated CLL.\par \par Additional labs with igG 545, igM 25, iga 76. [place]ron free light 2.79, Lamda FLC 0.73 and K/L ratio of 3.82.\par Flow cytometry with monotypic B cells positive for CD 5, CD 23, dim CD 19 and CD 20, negative for CD 10 and CD 38. FISH showed del 13q, BCR-ABL not detected. YADI 2 negative.\par \par \par Pt was initially seen by Oncologist at [facility] but has since switched care to [facility]  on active surveillance\par \par Pt seen and examined; feels well\par No acute complaint at this time\par \par FH; + NHL in mother\par \par SH-  with  kids\par works for a [employer]\par Denies smoking, illicit drug abuse\par drinks socially\par \par \par \par \par

## 2022-11-06 NOTE — PHYSICAL EXAM
[General Appearance - Alert] : alert [General Appearance - In No Acute Distress] : in no acute distress [Sclera] : the sclera and conjunctiva were normal [Hearing Threshold Finger Rub Not [place]] : hearing was normal [Auscultation Breath Sounds / Voice Sounds] : lungs were clear to auscultation bilaterally [Heart Sounds] : normal S1 and S2 [Edema] : there was no peripheral edema [Abdomen Soft] : soft [No CVA Tenderness] : no ~M costovertebral angle tenderness [Abnormal Walk] : normal gait [] : no rash [No Focal Deficits] : no focal deficits [Oriented To Time, Place, And Person] : oriented to person, place, and time [Impaired Insight] : insight and judgment were intact [Affect] : the affect was normal [FreeTextEntry1] : splenomegaly

## 2022-11-06 NOTE — ASSESSMENT
[FreeTextEntry1] : CLL /SLL- del 13q- dx in 10/2020\par On active observation\par Pt with elevated [place] free light chain and high KLC ratio of 3.8 on presentation-> 4.5 in 11/20--> 6.5 in 07/2022\par No M spike on SPEP, no monoclonal bands on SIFE\par \par \par CKD stage III in absence of any pre existing ckd risk factors\par SCr 1.3 at baseline- creatinine has been elevated since 2020\par he had pre renal Gene back in 10/2020 which improved and kidney function has stayed stable since then\par l serum albumin levels are normal\par UA bland, no protein/ rbcs- tp/cr ratio pending\par Normal appearing kidneys on CT scan in 2020\par Obtain Renal US at this time to r/o leukemic infiltration\par Obtain 24 hour urine collection \par UPEP, Bence Devi protein\par \par Monoclonal kappa 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- as FLC ratio elevation has followed the worsening WBC trend\par Flow cytometry of Kappa light chain to match B- cell's light chain restriction \par \par Kidney biopsy would be definitive to r/o paraproteinemia associated kidney disease given CKD- however, doubt kidney involvement given bland urine and stable creatinine\par I would recommend monitoring for now with serial creatinine, UA, Protein/cr ratio \par Will do kidney biopsy if worsening renal indices \par \par Noted pseudohyperkalemia- in setting of leukocytosis\par \par \par RTC in 1 month \par \par plan discussed with pt on 10/28- 1100 am\par

## 2022-11-22 ENCOUNTER — NON-APPOINTMENT (OUTPATIENT)
Age: 57
End: 2022-11-22

## 2022-11-23 ENCOUNTER — APPOINTMENT (OUTPATIENT)
Dept: NEPHROLOGY | Facility: CLINIC | Age: 57
End: 2022-11-23

## 2022-11-23 VITALS
WEIGHT: 169 LBS | HEART RATE: 78 BPM | OXYGEN SATURATION: 100 % | SYSTOLIC BLOOD PRESSURE: 144 MMHG | DIASTOLIC BLOOD PRESSURE: 82 MMHG | HEIGHT: 70 IN | BODY MASS INDEX: 24.2 KG/M2

## 2022-11-23 DIAGNOSIS — E87.5 HYPERKALEMIA: ICD-10-CM

## 2022-11-23 PROCEDURE — 99214 OFFICE O/P EST MOD 30 MIN: CPT

## 2022-11-26 LAB
APPEARANCE: CLEAR
BACTERIA: NEGATIVE
BILIRUBIN URINE: NEGATIVE
BLOOD URINE: NEGATIVE
COLOR: COLORLESS
CREAT SPEC-SCNC: 29 MG/DL
CREAT/PROT UR: NORMAL RATIO
GLUCOSE QUALITATIVE U: NEGATIVE
HYALINE CASTS: 0 /LPF
KETONES URINE: NEGATIVE
LEUKOCYTE ESTERASE URINE: NEGATIVE
MICROSCOPIC-UA: NORMAL
NITRITE URINE: NEGATIVE
PH URINE: 6.5
PROT UR-MCNC: <4 MG/DL
PROTEIN URINE: NEGATIVE
RED BLOOD CELLS URINE: 0 /HPF
SPECIFIC GRAVITY URINE: 1
SQUAMOUS EPITHELIAL CELLS: 0 /HPF
UROBILINOGEN URINE: NORMAL
WHITE BLOOD CELLS URINE: 0 /HPF

## 2023-03-02 ENCOUNTER — APPOINTMENT (OUTPATIENT)
Dept: NEPHROLOGY | Facility: CLINIC | Age: 58
End: 2023-03-02
Payer: COMMERCIAL

## 2023-03-02 VITALS
HEART RATE: 88 BPM | BODY MASS INDEX: 24.91 KG/M2 | OXYGEN SATURATION: 99 % | WEIGHT: 174 LBS | SYSTOLIC BLOOD PRESSURE: 120 MMHG | HEIGHT: 70 IN | RESPIRATION RATE: 14 BRPM | DIASTOLIC BLOOD PRESSURE: 86 MMHG

## 2023-03-02 DIAGNOSIS — D89.2 HYPERGAMMAGLOBULINEMIA, UNSPECIFIED: ICD-10-CM

## 2023-03-02 PROCEDURE — 99214 OFFICE O/P EST MOD 30 MIN: CPT

## 2023-03-02 NOTE — HISTORY OF PRESENT ILLNESS
[FreeTextEntry1] :  57 year male pt with PMH of CLL referred from [facility] for evaluation of CKD and elevated KLC ratio.\par \par Pt had initially presented to [facility] ED in 10/2020 for abdominal pain after consuming a fatty meal with a few beers. Patient was found to have a WBC count of 69 k with 85% Lymphocytes. Also with Scr 1.6. CT abdomen pelvis with gall bladder wall edema. and splenomegaly 16 cm. Patient had a cholecystectomy on 10/6/20 . Cystic duct Lymph node demonstrated CLL.\par \par Additional labs with igG 545, igM 25, iga 76. [place] free light 2.79, [place]da FLC 0.73 and K/L ratio of 3.82.\par Flow cytometry with monotypic B cells positive for CD 5, CD 23, dim CD 19 and CD 20, negative for CD 10 and CD 38. FISH showed del 13q, BCR-ABL not detected. YADI 2 negative.\par \par \par Pt was initially seen by Oncologist at [facility] but has since switched care to [facility]-  on active surveillance\par \par Pt seen and examined; feels well\par No acute complaint at this time\par \par FH; + NHL in mother\par \par SH-  with  kids\par works for a [employer]\par Denies smoking, illicit drug abuse\par drinks socially\par \par -----------------------------------------------------------------------------------------------------------\par \par 11/23/2022\par pt presents for f/u of ckd\par pt seen and examined; feels well\par He is recovering from a URI- covid was negative\par \par Seen by heme-onc recently, noted to have worsening anemia\par Was told he may be started on chemotherapy if he continues to have worsening anemia/ thrombocytopenia/ splenomegaly.\par Next scheduled for f/u in early Jan.\par \par ---------------------------------------------------------------------------------------\par \par 03/02/2023\par Pt presents for f/u of ckd\par pt seen and examined; feels well\par No acute complaint\par Scheduled to f.u at [facility] on Monday\par \par \par \par \par

## 2023-03-02 NOTE — PHYSICAL EXAM
[General Appearance - Alert] : alert [General Appearance - In No Acute Distress] : in no acute distress [Sclera] : the sclera and conjunctiva were normal [Hearing Threshold Finger Rub Not [place]] : hearing was normal [Auscultation Breath Sounds / Voice Sounds] : lungs were clear to auscultation bilaterally [Heart Sounds] : normal S1 and S2 [Edema] : there was no peripheral edema [Abdomen Soft] : soft [No CVA Tenderness] : no ~M costovertebral angle tenderness [Abnormal Walk] : normal gait [] : no rash [No Focal Deficits] : no focal deficits [Oriented To Time, Place, And Person] : oriented to person, place, and time [Impaired Insight] : insight and judgment were intact [Affect] : the affect was normal [FreeTextEntry1] : splenomegaly

## 2023-03-02 NOTE — ASSESSMENT
[FreeTextEntry1] : CLL /SLL- del 13q- dx in 10/2020\par On active observation\par Pt with elevated [place] free light chain and high KLC ratio of 3.8 on presentation-> 4.5 in 11/20--> 6.5 in 07/2022\par No M spike on SPEP, no monoclonal bands on SIFE\par \par \par CKD stage III in absence of any pre existing ckd risk factors\par SCr 1.3 at baseline- creatinine has been elevated since 2020\par he had pre renal Gene back in 10/2020 which improved and kidney function has stayed stable since then at 1.3\par Serum albumin levels are normal\par UA bland, no protein/ rbcs- tp/cr ratio unable to calculate\par Normal appearing kidneys on CT scan in 2020\par Renal US with normal appearing kidneys\par \par 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

## 2023-03-02 NOTE — ASSESSMENT
[FreeTextEntry1] : CLL /SLL- del 13q- dx in 10/2020\par On active observation\par Pt with elevated [place] free light chain and high KLC ratio of 3.8 on presentation-> 4.5 in 11/20--> 6.5 in 07/2022\par No M spike on SPEP, no monoclonal bands on SIFE\par \par \par CKD stage III in absence of any pre existing ckd risk factors\par SCr 1.3 at baseline- creatinine has been elevated since 2020\par he had pre renal Gene back in 10/2020 which improved and kidney function has stayed stable since then\par Serum albumin levels are normal\par UA bland, no protein/ rbcs- tp/cr ratio unable to calculate\par Normal appearing kidneys on CT scan in 2020\par Renal US with normal appearing kidneys\par \par 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

## 2023-03-02 NOTE — PHYSICAL EXAM
[General Appearance - Alert] : alert [General Appearance - In No Acute Distress] : in no acute distress [Sclera] : the sclera and conjunctiva were normal [Hearing Threshold Finger Rub Not [place]] : hearing was normal [Auscultation Breath Sounds / Voice Sounds] : lungs were clear to auscultation bilaterally [Heart Sounds] : normal S1 and S2 [Edema] : there was no peripheral edema [Abdomen Soft] : soft [No CVA Tenderness] : no ~M costovertebral angle tenderness [Abnormal Walk] : normal gait [] : no rash [No Focal Deficits] : no focal deficits [Oriented To Time, Place, And Person] : oriented to person, place, and time [Impaired Insight] : insight and judgment were intact [Affect] : the affect was normal [FreeTextEntry1] : splenomegaly

## 2023-03-02 NOTE — HISTORY OF PRESENT ILLNESS
[FreeTextEntry1] :  57 year male pt with PMH of CLL referred from [facility] for evaluation of CKD and elevated KLC ratio.\par \par Pt had initially presented to [facility] ED in 10/2020 for abdominal pain after consuming a fatty meal with a few beers. Patient was found to have a WBC count of 69 k with 85% Lymphocytes. Also with Scr 1.6. CT abdomen pelvis with gall bladder wall edema. and splenomegaly 16 cm. Patient had a cholecystectomy on 10/6/20 . Cystic duct Lymph node demonstrated CLL.\par \par Additional labs with igG 545, igM 25, iga 76. [place] free light 2.79, Lamda FLC 0.73 and K/L ratio of 3.82.\par Flow cytometry with monotypic B cells positive for CD 5, CD 23, dim CD 19 and CD 20, negative for CD 10 and CD 38. FISH showed del 13q, BCR-ABL not detected. YADI 2 negative.\par \par \par Pt was initially seen by Oncologist at [facility] but has since switched care to [facility]-  on active surveillance\par \par Pt seen and examined; feels well\par No acute complaint at this time\par \par FH; + NHL in mother\par \par SH-  with  kids\par works for a [employer]\par Denies smoking, illicit drug abuse\par drinks socially\par \par -----------------------------------------------------------------------------------------------------------\par \par 11/23/2022\par pt presents for f/u of ckd\par pt seen and examined; feels well\par He is recovering from a URI- covid was negative\par \par Seen by heme -onc recently, noted to have worsening anemia\par Was told he may be started on chemotherapy if he continues to have worsening anemia/ thrombocytopenia/ splenomegaly.\par Next scheduled for f/u in early Jan.\par ============================================\par \par 03/02\par pt presents for f/u of ckd\par pt seen and examined; feels well\par Scheduled for f/u apt at [facility] on Monday\par \par Labs reviewed on pts' phone\par Scr 1.5\par hb 12.3, plts 118\par wbc 89.7 \par \par \par \par

## 2023-03-08 LAB
APPEARANCE: CLEAR
BACTERIA: NEGATIVE
BILIRUBIN URINE: NEGATIVE
BLOOD URINE: NEGATIVE
COLOR: NORMAL
CREAT SPEC-SCNC: 55 MG/DL
CREAT/PROT UR: 0.1 RATIO
GLUCOSE QUALITATIVE U: NEGATIVE
HYALINE CASTS: 0 /LPF
KETONES URINE: NEGATIVE
LEUKOCYTE ESTERASE URINE: NEGATIVE
MICROSCOPIC-UA: NORMAL
NITRITE URINE: NEGATIVE
PH URINE: 7
PROT UR-MCNC: 7 MG/DL
PROTEIN URINE: NORMAL
RED BLOOD CELLS URINE: 4 /HPF
SPECIFIC GRAVITY URINE: 1.01
SQUAMOUS EPITHELIAL CELLS: 0 /HPF
UROBILINOGEN URINE: NORMAL
WHITE BLOOD CELLS URINE: 1 /HPF

## 2023-07-02 DIAGNOSIS — M25.511 PAIN IN RIGHT SHOULDER: ICD-10-CM

## 2023-09-05 NOTE — REASON FOR VISIT
[Initial Evaluation] : an initial evaluation [Spouse] : spouse [id number]-Initial OBS or IP - high complexity OR  mins

## 2023-09-12 ENCOUNTER — APPOINTMENT (OUTPATIENT)
Dept: NEPHROLOGY | Facility: CLINIC | Age: 58
End: 2023-09-12
Payer: COMMERCIAL

## 2023-09-12 VITALS
HEIGHT: 70 IN | OXYGEN SATURATION: 99 % | DIASTOLIC BLOOD PRESSURE: 80 MMHG | BODY MASS INDEX: 24.77 KG/M2 | HEART RATE: 70 BPM | WEIGHT: 173 LBS | SYSTOLIC BLOOD PRESSURE: 132 MMHG

## 2023-09-12 PROCEDURE — 99214 OFFICE O/P EST MOD 30 MIN: CPT

## 2023-09-12 RX ORDER — FOLIC ACID 1 MG/1
1 TABLET ORAL DAILY
Qty: 90 | Refills: 1 | Status: ACTIVE | COMMUNITY
Start: 2023-09-12

## 2023-12-11 ENCOUNTER — NON-APPOINTMENT (OUTPATIENT)
Age: 58
End: 2023-12-11

## 2023-12-19 ENCOUNTER — APPOINTMENT (OUTPATIENT)
Dept: FAMILY MEDICINE | Facility: CLINIC | Age: 58
End: 2023-12-19
Payer: COMMERCIAL

## 2023-12-19 ENCOUNTER — NON-APPOINTMENT (OUTPATIENT)
Age: 58
End: 2023-12-19

## 2023-12-19 VITALS
TEMPERATURE: 97.9 F | HEIGHT: 70 IN | HEART RATE: 125 BPM | DIASTOLIC BLOOD PRESSURE: 96 MMHG | SYSTOLIC BLOOD PRESSURE: 138 MMHG | OXYGEN SATURATION: 98 % | WEIGHT: 174 LBS | BODY MASS INDEX: 24.91 KG/M2

## 2023-12-19 DIAGNOSIS — Z13.6 ENCOUNTER FOR SCREENING FOR CARDIOVASCULAR DISORDERS: ICD-10-CM

## 2023-12-19 DIAGNOSIS — Z12.5 ENCOUNTER FOR SCREENING FOR MALIGNANT NEOPLASM OF PROSTATE: ICD-10-CM

## 2023-12-19 DIAGNOSIS — Z00.00 ENCOUNTER FOR GENERAL ADULT MEDICAL EXAMINATION W/OUT ABNORMAL FINDINGS: ICD-10-CM

## 2023-12-19 DIAGNOSIS — C91.10 CHRONIC LYMPHOCYTIC LEUKEMIA OF B-CELL TYPE NOT HAVING ACHIEVED REMISSION: ICD-10-CM

## 2023-12-19 DIAGNOSIS — N18.31 CHRONIC KIDNEY DISEASE, STAGE 3A: ICD-10-CM

## 2023-12-19 PROCEDURE — 99396 PREV VISIT EST AGE 40-64: CPT | Mod: 25

## 2023-12-19 PROCEDURE — 36415 COLL VENOUS BLD VENIPUNCTURE: CPT

## 2023-12-19 PROCEDURE — 81003 URINALYSIS AUTO W/O SCOPE: CPT | Mod: QW

## 2023-12-19 PROCEDURE — 93000 ELECTROCARDIOGRAM COMPLETE: CPT

## 2023-12-20 PROBLEM — C91.10 CLL (CHRONIC LYMPHOCYTIC LEUKEMIA): Status: ACTIVE | Noted: 2020-10-14

## 2023-12-20 LAB
25(OH)D3 SERPL-MCNC: 49.6 NG/ML
ALBUMIN SERPL ELPH-MCNC: 4.7 G/DL
ALP BLD-CCNC: 82 U/L
ALT SERPL-CCNC: 10 U/L
ANION GAP SERPL CALC-SCNC: 11 MMOL/L
AST SERPL-CCNC: 15 U/L
BASOPHILS # BLD AUTO: 0.01 K/UL
BASOPHILS NFR BLD AUTO: 0.1 %
BILIRUB SERPL-MCNC: 1.1 MG/DL
BILIRUB UR QL STRIP: NORMAL
BUN SERPL-MCNC: 13 MG/DL
CALCIUM SERPL-MCNC: 9.5 MG/DL
CHLORIDE SERPL-SCNC: 102 MMOL/L
CHOLEST SERPL-MCNC: 168 MG/DL
CLARITY UR: NORMAL
CO2 SERPL-SCNC: 26 MMOL/L
COLLECTION METHOD: NORMAL
CREAT SERPL-MCNC: 1.26 MG/DL
EGFR: 66 ML/MIN/1.73M2
EOSINOPHIL # BLD AUTO: 0.02 K/UL
EOSINOPHIL NFR BLD AUTO: 0.3 %
ESTIMATED AVERAGE GLUCOSE: 80 MG/DL
GLUCOSE SERPL-MCNC: 101 MG/DL
GLUCOSE UR-MCNC: NEGATIVE
HBA1C MFR BLD HPLC: 4.4 %
HCG UR QL: 1 EU/DL
HCT VFR BLD CALC: 45.2 %
HDLC SERPL-MCNC: 74 MG/DL
HGB BLD-MCNC: 15 G/DL
HGB UR QL STRIP.AUTO: NORMAL
IMM GRANULOCYTES NFR BLD AUTO: 0.3 %
KETONES UR-MCNC: NORMAL
LDLC SERPL CALC-MCNC: 79 MG/DL
LEUKOCYTE ESTERASE UR QL STRIP: NEGATIVE
LYMPHOCYTES # BLD AUTO: 0.68 K/UL
LYMPHOCYTES NFR BLD AUTO: 10 %
MAN DIFF?: NORMAL
MCHC RBC-ENTMCNC: 27.7 PG
MCHC RBC-ENTMCNC: 33.2 GM/DL
MCV RBC AUTO: 83.4 FL
MONOCYTES # BLD AUTO: 0.61 K/UL
MONOCYTES NFR BLD AUTO: 9 %
NEUTROPHILS # BLD AUTO: 5.46 K/UL
NEUTROPHILS NFR BLD AUTO: 80.3 %
NITRITE UR QL STRIP: NEGATIVE
NONHDLC SERPL-MCNC: 94 MG/DL
PH UR STRIP: 6.5
PLATELET # BLD AUTO: 145 K/UL
POTASSIUM SERPL-SCNC: 4.2 MMOL/L
PROT SERPL-MCNC: 6.5 G/DL
PROT UR STRIP-MCNC: NORMAL
PSA SERPL-MCNC: 1.35 NG/ML
RBC # BLD: 5.42 M/UL
RBC # FLD: 14.4 %
SODIUM SERPL-SCNC: 138 MMOL/L
SP GR UR STRIP: 1.02
TRIGL SERPL-MCNC: 83 MG/DL
TSH SERPL-ACNC: 2.17 UIU/ML
WBC # FLD AUTO: 6.8 K/UL

## 2023-12-20 NOTE — HEALTH RISK ASSESSMENT
[Yes] : Yes [2 - 4 times a month (2 pts)] : 2-4 times a month (2 points) [1 or 2 (0 pts)] : 1 or 2 (0 points) [Never (0 pts)] : Never (0 points) [No] : In the past 12 months have you used drugs other than those required for medical reasons? No [0] : 2) Feeling down, depressed, or hopeless: Not at all (0) [Good] : ~his/her~  mood as  good [Audit-CScore] : 2 [[id number]] : 0 [Never] : Never

## 2023-12-20 NOTE — HISTORY OF PRESENT ILLNESS
[FreeTextEntry1] : Patient is here for his comprehensive physical examination.  [de-identified] : Mr. MAGGI SCHAEFER is a 58-year male with a PMH of CLL (Dx. 20/14/202) and CKD III presents for a CPE.  Patient has been following at [facility] for management of the CLL and is doing well. He also follows with Dr. Ravi (Nephrology) for CKD. Patient feels well, his WBC's has come to normal levels since being on Veneto Clax.

## 2024-01-23 ENCOUNTER — APPOINTMENT (OUTPATIENT)
Dept: FAMILY MEDICINE | Facility: CLINIC | Age: 59
End: 2024-01-23
Payer: COMMERCIAL

## 2024-01-23 VITALS
HEART RATE: 94 BPM | HEIGHT: 70 IN | OXYGEN SATURATION: 98 % | WEIGHT: 172 LBS | TEMPERATURE: 97.8 F | SYSTOLIC BLOOD PRESSURE: 158 MMHG | BODY MASS INDEX: 24.62 KG/M2 | DIASTOLIC BLOOD PRESSURE: 84 MMHG

## 2024-01-23 DIAGNOSIS — J01.00 ACUTE MAXILLARY SINUSITIS, UNSPECIFIED: ICD-10-CM

## 2024-01-23 PROCEDURE — 99213 OFFICE O/P EST LOW 20 MIN: CPT

## 2024-01-23 RX ORDER — VENETOCLAX 100 MG/1
100 TABLET, FILM COATED ORAL DAILY
Refills: 0 | Status: ACTIVE | COMMUNITY
Start: 2024-01-23

## 2024-01-24 NOTE — HISTORY OF PRESENT ILLNESS
[FreeTextEntry1] : Patient is following up on sinus infection for about 6 weeks. [de-identified] : Mr. MAGGI SCHAEFER is a 59-year male presenting with complaint of sinus congestion for about 6 weeks. Patient notes it was initially getting better but then worsened in the last week. He is being treated for CLL on Venclexta. He notes over the course of the last 2 years, he does not get over infections as quickly as he used.

## 2024-03-19 ENCOUNTER — APPOINTMENT (OUTPATIENT)
Dept: NEPHROLOGY | Facility: CLINIC | Age: 59
End: 2024-03-19
Payer: COMMERCIAL

## 2024-03-19 VITALS
BODY MASS INDEX: 24.77 KG/M2 | SYSTOLIC BLOOD PRESSURE: 122 MMHG | HEIGHT: 70 IN | HEART RATE: 63 BPM | OXYGEN SATURATION: 100 % | DIASTOLIC BLOOD PRESSURE: 82 MMHG | WEIGHT: 173 LBS

## 2024-03-19 PROCEDURE — 99214 OFFICE O/P EST MOD 30 MIN: CPT

## 2024-03-19 RX ORDER — AMOXICILLIN AND CLAVULANATE POTASSIUM 875; 125 MG/1; MG/1
875-125 TABLET, COATED ORAL
Qty: 20 | Refills: 0 | Status: DISCONTINUED | COMMUNITY
Start: 2024-01-23 | End: 2024-03-19

## 2024-03-21 NOTE — PHYSICAL EXAM
[General Appearance - Alert] : alert [General Appearance - In No Acute Distress] : in no acute distress [Sclera] : the sclera and conjunctiva were normal [Hearing Threshold Finger Rub Not [place]] : hearing was normal [Auscultation Breath Sounds / Voice Sounds] : lungs were clear to auscultation bilaterally [Heart Sounds] : normal S1 and S2 [Edema] : there was no peripheral edema [No CVA Tenderness] : no ~M costovertebral angle tenderness [Abdomen Soft] : soft [Abnormal Walk] : normal gait [No Focal Deficits] : no focal deficits [] : no rash [Impaired Insight] : insight and judgment were intact [Oriented To Time, Place, And Person] : oriented to person, place, and time [Affect] : the affect was normal [FreeTextEntry1] : splenomegaly

## 2024-03-21 NOTE — ASSESSMENT
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

## 2024-03-21 NOTE — HISTORY OF PRESENT ILLNESS
[FreeTextEntry1] :  57 year male pt with PMH of CLL referred from [facility] for evaluation of CKD and elevated KLC ratio.  Pt had initially presented to [facility] ED in 10/2020 for abdominal pain after consuming a fatty meal with a few beers. Patient was found to have a WBC count of 69 k with 85% Lymphocytes. Also with Scr 1.6. CT abdomen pelvis with gall bladder wall edema. and splenomegaly 16 cm. Patient had a cholecystectomy on 10/6/20 . Cystic duct Lymph node demonstrated CLL.  Additional labs with igG 545, igM 25, iga 76. [place] free light 2.79, [place] FLC 0.73 and K/L ratio of 3.82. Flow cytometry with monotypic B cells positive for CD 5, CD 23, dim CD 19 and CD 20, negative for CD 10 and CD 38. FISH showed del 13q, BCR-ABL not detected. YADI 2 negative.   Pt was initially seen by Oncologist at [facility] but has since switched care to [facility]-  on active surveillance  Pt seen and examined; feels well No acute complaint at this time  FH; + NHL in mother  SH-  with  kids works for a [employer] Denies smoking, illicit drug abuse drinks socially  -----------------------------------------------------------------------------------------------------------  11/23/2022 pt presents for f/u of ckd pt seen and examined; feels well He is recovering from a URI- covid was negative  Seen by heme-onc recently, noted to have worsening anemia Was told he may be started on chemotherapy if he continues to have worsening anemia/ thrombocytopenia/ splenomegaly. Next scheduled for f/u in early Jan.  ---------------------------------------------------------------------------------------  03/02/2023 Pt presents for f/u of ckd pt seen and examined; feels well. No acute complaint Scheduled to f.u at [facility] on Monday  -----------------------------------------------------  09/12/2023 Pt presents for f/u of CKD. pt seen and examined; feels well. Started Chemotherapy in May- on Venetoclax and Obinutuzumab -----------------------------------------  03/19/2024 Pt presents for f/u of CKD. pt seen and examined; feels well. Follows at [facility]-

## 2024-04-03 NOTE — DISCHARGE NOTE PROVIDER - NSDCFUADDAPPT_GEN_ALL_CORE_FT
Abnormal Test Results     Name: Josee Farley     Phone Number:  [phone]    Ordering Provider: Casandra Chung NP    Does [facility] triage for ordering provider?  Yes    Test Date/Time:  4/2/24 1103    Test Name and Value: Potassium 6.4    Test Normal Range: 3.4-5.1    Caller Information         Type Contact Phone/Fax    04/02/2024 07:16 PM CDT Phone (Incoming) Eve (Lab) [phone]          -------------------------------------------------------  Is the patient having symptoms?: N/A  Use inrhigh or inrlow if INR lab result)   A [employer] page was sent to Dr. Miguel Angel Maher.    The page included the following message: Critical Potassium of 6.4  Results: MD read above message at 1932    MD called back . States that pt should go to ER if having any chest pain, palpitations. Hold losartan tonight. Stop any supplements. Stat Potassium level tomorrow.  Pt called and informed of above. Verbalized understanding. States she takes a lot of supplements.   Dr. Maher called back. States that after further review of chart[facility].   Pt called and informed of MD orders. Verbalized understanding and agreeable.  Reason for Disposition  • Lab or radiology calling with CRITICAL test results    Protocols used: PCP Call - No Triage-A-     Please follow up with Dr. Antony (Hematology) in 1 week  Please follow up with Dr. Arevalo (Surgery) in 2 weeks

## 2024-08-07 ENCOUNTER — APPOINTMENT (OUTPATIENT)
Dept: FAMILY MEDICINE | Facility: CLINIC | Age: 59
End: 2024-08-07

## 2024-08-07 PROBLEM — J01.00 SUBACUTE MAXILLARY SINUSITIS: Status: ACTIVE | Noted: 2024-01-23

## 2024-08-07 PROCEDURE — 99213 OFFICE O/P EST LOW 20 MIN: CPT

## 2024-08-07 NOTE — ASSESSMENT
[FreeTextEntry1] : Differential diagnoses include Rhinosinusitis, Allergic rhinitis, Vasomotor rhinitis, Nasal polyps.

## 2024-08-07 NOTE — HISTORY OF PRESENT ILLNESS
[FreeTextEntry8] : Patient is complaining of persistent nasal discharge. Onset of symptoms in mid-May with a nasal issue characterized by a yellow nasal discharge. It is described as not dripping but rather a thick, yellow discharge coming out of his nose. Patient mentioned that his immune system was low at the time due to ongoing immunosuppressive treatment. He visited [facility] in May, where a viral panel and COVID test were negative, and he was diagnosed with rhinovirus (common cold). He was Prescribed an antibiotic (amoxicillin) and a steroid (Medrol dose pack) at that time. Symptoms persisted despite treatment.  He has tried over-the-counter medications like Claritin and Flonase with some improvement. He recently started Asterpro.  He received IVIG (intravenous immunoglobulin) treatment on July 22nd, which increased his immune system levels During the appointment, he had to blow his nose and inspection of the discharge showed some green tinges with the thick yellow discharge.

## 2024-08-07 NOTE — PLAN
[FreeTextEntry1] : - Prescribe Medrol dose pack (oral steroid)  - Prescribe doxycycline (antibiotic)  - After completing the Medrol dose pack, switch to Dymista nasal spray (combination of steroid and antihistamine)  - Discontinue Claritin-D and Astepro nasal spray once Dymista is started.   - Follow-up appointment to reassess symptoms and response to treatment, if symptoms persist

## 2025-03-10 ENCOUNTER — NON-APPOINTMENT (OUTPATIENT)
Age: 60
End: 2025-03-10

## 2025-03-14 DIAGNOSIS — N18.31 CHRONIC KIDNEY DISEASE, STAGE 3A: ICD-10-CM

## 2025-03-26 ENCOUNTER — APPOINTMENT (OUTPATIENT)
Dept: FAMILY MEDICINE | Facility: CLINIC | Age: 60
End: 2025-03-26
Payer: COMMERCIAL

## 2025-03-26 ENCOUNTER — NON-APPOINTMENT (OUTPATIENT)
Age: 60
End: 2025-03-26

## 2025-03-26 VITALS
SYSTOLIC BLOOD PRESSURE: 130 MMHG | HEART RATE: 67 BPM | HEIGHT: 70 IN | OXYGEN SATURATION: 99 % | WEIGHT: 180 LBS | BODY MASS INDEX: 25.77 KG/M2 | DIASTOLIC BLOOD PRESSURE: 90 MMHG

## 2025-03-26 DIAGNOSIS — J01.00 ACUTE MAXILLARY SINUSITIS, UNSPECIFIED: ICD-10-CM

## 2025-03-26 DIAGNOSIS — Z13.6 ENCOUNTER FOR SCREENING FOR CARDIOVASCULAR DISORDERS: ICD-10-CM

## 2025-03-26 DIAGNOSIS — C91.10 CHRONIC LYMPHOCYTIC LEUKEMIA OF B-CELL TYPE NOT HAVING ACHIEVED REMISSION: ICD-10-CM

## 2025-03-26 DIAGNOSIS — Z00.00 ENCOUNTER FOR GENERAL ADULT MEDICAL EXAMINATION W/OUT ABNORMAL FINDINGS: ICD-10-CM

## 2025-03-26 DIAGNOSIS — N18.31 CHRONIC KIDNEY DISEASE, STAGE 3A: ICD-10-CM

## 2025-03-26 LAB
ALBUMIN SERPL ELPH-MCNC: 4.5 G/DL
ALP BLD-CCNC: 67 U/L
ALT SERPL-CCNC: 12 U/L
ANION GAP SERPL CALC-SCNC: 11 MMOL/L
APPEARANCE: CLEAR
AST SERPL-CCNC: 17 U/L
BACTERIA: NEGATIVE /HPF
BASOPHILS # BLD AUTO: 0.01 K/UL
BASOPHILS NFR BLD AUTO: 0.2 %
BILIRUB SERPL-MCNC: 0.5 MG/DL
BILIRUBIN URINE: NEGATIVE
BLOOD URINE: ABNORMAL
BUN SERPL-MCNC: 17 MG/DL
CALCIUM SERPL-MCNC: 9.5 MG/DL
CAST: 0 /LPF
CHLORIDE SERPL-SCNC: 106 MMOL/L
CHOLEST SERPL-MCNC: 203 MG/DL
CO2 SERPL-SCNC: 28 MMOL/L
COLOR: YELLOW
CREAT SERPL-MCNC: 1.16 MG/DL
EGFRCR SERPLBLD CKD-EPI 2021: 72 ML/MIN/1.73M2
EOSINOPHIL # BLD AUTO: 0.08 K/UL
EOSINOPHIL NFR BLD AUTO: 1.7 %
EPITHELIAL CELLS: 0 /HPF
ESTIMATED AVERAGE GLUCOSE: 88 MG/DL
GLUCOSE QUALITATIVE U: NEGATIVE MG/DL
GLUCOSE SERPL-MCNC: 95 MG/DL
HBA1C MFR BLD HPLC: 4.7 %
HCT VFR BLD CALC: 43.4 %
HDLC SERPL-MCNC: 66 MG/DL
HGB BLD-MCNC: 14.6 G/DL
IMM GRANULOCYTES NFR BLD AUTO: 0.4 %
KETONES URINE: NEGATIVE MG/DL
LDLC SERPL-MCNC: 123 MG/DL
LEUKOCYTE ESTERASE URINE: NEGATIVE
LYMPHOCYTES # BLD AUTO: 1.35 K/UL
LYMPHOCYTES NFR BLD AUTO: 29 %
MAN DIFF?: NORMAL
MCHC RBC-ENTMCNC: 27.5 PG
MCHC RBC-ENTMCNC: 33.6 G/DL
MCV RBC AUTO: 81.7 FL
MICROSCOPIC-UA: NORMAL
MONOCYTES # BLD AUTO: 0.33 K/UL
MONOCYTES NFR BLD AUTO: 7.1 %
NEUTROPHILS # BLD AUTO: 2.86 K/UL
NEUTROPHILS NFR BLD AUTO: 61.6 %
NITRITE URINE: NEGATIVE
NONHDLC SERPL-MCNC: 137 MG/DL
PH URINE: 6.5
PLATELET # BLD AUTO: 162 K/UL
POTASSIUM SERPL-SCNC: 4.4 MMOL/L
PROT SERPL-MCNC: 6.4 G/DL
PROTEIN URINE: NEGATIVE MG/DL
RBC # BLD: 5.31 M/UL
RBC # FLD: 13.2 %
RED BLOOD CELLS URINE: 2 /HPF
SODIUM SERPL-SCNC: 144 MMOL/L
SPECIFIC GRAVITY URINE: 1.02
TRIGL SERPL-MCNC: 81 MG/DL
TSH SERPL-ACNC: 3.25 UIU/ML
UROBILINOGEN URINE: 0.2 MG/DL
WBC # FLD AUTO: 4.65 K/UL
WHITE BLOOD CELLS URINE: 0 /HPF

## 2025-03-26 PROCEDURE — 99396 PREV VISIT EST AGE 40-64: CPT

## 2025-03-26 PROCEDURE — 93000 ELECTROCARDIOGRAM COMPLETE: CPT

## 2025-04-08 ENCOUNTER — APPOINTMENT (OUTPATIENT)
Dept: NEPHROLOGY | Facility: CLINIC | Age: 60
End: 2025-04-08
Payer: COMMERCIAL

## 2025-04-08 VITALS
HEIGHT: 70 IN | OXYGEN SATURATION: 100 % | BODY MASS INDEX: 25.77 KG/M2 | HEART RATE: 100 BPM | SYSTOLIC BLOOD PRESSURE: 140 MMHG | DIASTOLIC BLOOD PRESSURE: 82 MMHG | WEIGHT: 180 LBS

## 2025-04-08 PROCEDURE — 99213 OFFICE O/P EST LOW 20 MIN: CPT

## 2025-04-08 PROCEDURE — G2211 COMPLEX E/M VISIT ADD ON: CPT | Mod: NC
